# Patient Record
Sex: MALE | Race: WHITE | URBAN - METROPOLITAN AREA
[De-identification: names, ages, dates, MRNs, and addresses within clinical notes are randomized per-mention and may not be internally consistent; named-entity substitution may affect disease eponyms.]

---

## 2017-10-06 ENCOUNTER — EMERGENCY (EMERGENCY)
Facility: HOSPITAL | Age: 37
LOS: 0 days | Discharge: HOME | End: 2017-10-07
Admitting: INTERNAL MEDICINE

## 2017-10-06 DIAGNOSIS — Z87.891 PERSONAL HISTORY OF NICOTINE DEPENDENCE: ICD-10-CM

## 2017-10-06 DIAGNOSIS — R11.2 NAUSEA WITH VOMITING, UNSPECIFIED: ICD-10-CM

## 2017-10-06 DIAGNOSIS — R07.89 OTHER CHEST PAIN: ICD-10-CM

## 2017-10-06 DIAGNOSIS — R10.84 GENERALIZED ABDOMINAL PAIN: ICD-10-CM

## 2017-10-06 DIAGNOSIS — K52.9 NONINFECTIVE GASTROENTERITIS AND COLITIS, UNSPECIFIED: ICD-10-CM

## 2017-10-06 DIAGNOSIS — L03.90 CELLULITIS, UNSPECIFIED: ICD-10-CM

## 2018-04-11 ENCOUNTER — INPATIENT (INPATIENT)
Facility: HOSPITAL | Age: 38
LOS: 4 days | Discharge: LEFT BEFORE DISCHARGE | End: 2018-04-16
Attending: INTERNAL MEDICINE | Admitting: INTERNAL MEDICINE

## 2018-04-11 VITALS
DIASTOLIC BLOOD PRESSURE: 78 MMHG | RESPIRATION RATE: 20 BRPM | SYSTOLIC BLOOD PRESSURE: 171 MMHG | HEART RATE: 86 BPM | OXYGEN SATURATION: 99 % | TEMPERATURE: 102 F

## 2018-04-11 LAB
ANION GAP SERPL CALC-SCNC: 12 MMOL/L — SIGNIFICANT CHANGE UP (ref 7–14)
APTT BLD: 32.1 SEC — SIGNIFICANT CHANGE UP (ref 27–39.2)
BASOPHILS # BLD AUTO: 0.08 K/UL — SIGNIFICANT CHANGE UP (ref 0–0.2)
BASOPHILS NFR BLD AUTO: 0.3 % — SIGNIFICANT CHANGE UP (ref 0–1)
BLD GP AB SCN SERPL QL: SIGNIFICANT CHANGE UP
BUN SERPL-MCNC: 13 MG/DL — SIGNIFICANT CHANGE UP (ref 10–20)
CALCIUM SERPL-MCNC: 8.6 MG/DL — SIGNIFICANT CHANGE UP (ref 8.5–10.1)
CHLORIDE SERPL-SCNC: 96 MMOL/L — LOW (ref 98–110)
CO2 SERPL-SCNC: 30 MMOL/L — SIGNIFICANT CHANGE UP (ref 17–32)
CREAT SERPL-MCNC: 1 MG/DL — SIGNIFICANT CHANGE UP (ref 0.7–1.5)
EOSINOPHIL # BLD AUTO: 0.14 K/UL — SIGNIFICANT CHANGE UP (ref 0–0.7)
EOSINOPHIL NFR BLD AUTO: 0.6 % — SIGNIFICANT CHANGE UP (ref 0–8)
GLUCOSE SERPL-MCNC: 143 MG/DL — HIGH (ref 70–99)
HCT VFR BLD CALC: 37 % — LOW (ref 42–52)
HGB BLD-MCNC: 12.9 G/DL — LOW (ref 14–18)
IMM GRANULOCYTES NFR BLD AUTO: 1 % — HIGH (ref 0.1–0.3)
INR BLD: 1.2 RATIO — SIGNIFICANT CHANGE UP (ref 0.65–1.3)
LACTATE SERPL-SCNC: 2.2 MMOL/L — SIGNIFICANT CHANGE UP (ref 0.5–2.2)
LYMPHOCYTES # BLD AUTO: 13.6 % — LOW (ref 20.5–51.1)
LYMPHOCYTES # BLD AUTO: 3.27 K/UL — SIGNIFICANT CHANGE UP (ref 1.2–3.4)
MCHC RBC-ENTMCNC: 29.5 PG — SIGNIFICANT CHANGE UP (ref 27–31)
MCHC RBC-ENTMCNC: 34.9 G/DL — SIGNIFICANT CHANGE UP (ref 32–37)
MCV RBC AUTO: 84.5 FL — SIGNIFICANT CHANGE UP (ref 80–94)
MONOCYTES # BLD AUTO: 1.51 K/UL — HIGH (ref 0.1–0.6)
MONOCYTES NFR BLD AUTO: 6.3 % — SIGNIFICANT CHANGE UP (ref 1.7–9.3)
NEUTROPHILS # BLD AUTO: 18.76 K/UL — HIGH (ref 1.4–6.5)
NEUTROPHILS NFR BLD AUTO: 78.2 % — HIGH (ref 42.2–75.2)
NRBC # BLD: 0 /100 WBCS — SIGNIFICANT CHANGE UP (ref 0–0)
PLATELET # BLD AUTO: 304 K/UL — SIGNIFICANT CHANGE UP (ref 130–400)
POTASSIUM SERPL-MCNC: 3.8 MMOL/L — SIGNIFICANT CHANGE UP (ref 3.5–5)
POTASSIUM SERPL-SCNC: 3.8 MMOL/L — SIGNIFICANT CHANGE UP (ref 3.5–5)
PROTHROM AB SERPL-ACNC: 13 SEC — HIGH (ref 9.95–12.87)
RBC # BLD: 4.38 M/UL — LOW (ref 4.7–6.1)
RBC # FLD: 11.9 % — SIGNIFICANT CHANGE UP (ref 11.5–14.5)
SODIUM SERPL-SCNC: 138 MMOL/L — SIGNIFICANT CHANGE UP (ref 135–146)
TYPE + AB SCN PNL BLD: SIGNIFICANT CHANGE UP
WBC # BLD: 23.99 K/UL — HIGH (ref 4.8–10.8)
WBC # FLD AUTO: 23.99 K/UL — HIGH (ref 4.8–10.8)

## 2018-04-11 RX ORDER — ACETAMINOPHEN 500 MG
650 TABLET ORAL EVERY 4 HOURS
Qty: 0 | Refills: 0 | Status: DISCONTINUED | OUTPATIENT
Start: 2018-04-11 | End: 2018-04-12

## 2018-04-11 RX ORDER — IBUPROFEN 200 MG
600 TABLET ORAL ONCE
Qty: 0 | Refills: 0 | Status: COMPLETED | OUTPATIENT
Start: 2018-04-11 | End: 2018-04-11

## 2018-04-11 RX ORDER — SODIUM CHLORIDE 9 MG/ML
3 INJECTION INTRAMUSCULAR; INTRAVENOUS; SUBCUTANEOUS EVERY 8 HOURS
Qty: 0 | Refills: 0 | Status: DISCONTINUED | OUTPATIENT
Start: 2018-04-11 | End: 2018-04-16

## 2018-04-11 RX ORDER — ENOXAPARIN SODIUM 100 MG/ML
40 INJECTION SUBCUTANEOUS DAILY
Qty: 0 | Refills: 0 | Status: DISCONTINUED | OUTPATIENT
Start: 2018-04-11 | End: 2018-04-16

## 2018-04-11 RX ORDER — SODIUM CHLORIDE 9 MG/ML
1000 INJECTION INTRAMUSCULAR; INTRAVENOUS; SUBCUTANEOUS
Qty: 0 | Refills: 0 | Status: DISCONTINUED | OUTPATIENT
Start: 2018-04-11 | End: 2018-04-12

## 2018-04-11 RX ORDER — PIPERACILLIN AND TAZOBACTAM 4; .5 G/20ML; G/20ML
3.38 INJECTION, POWDER, LYOPHILIZED, FOR SOLUTION INTRAVENOUS EVERY 8 HOURS
Qty: 0 | Refills: 0 | Status: DISCONTINUED | OUTPATIENT
Start: 2018-04-11 | End: 2018-04-12

## 2018-04-11 RX ORDER — VANCOMYCIN HCL 1 G
1000 VIAL (EA) INTRAVENOUS ONCE
Qty: 0 | Refills: 0 | Status: COMPLETED | OUTPATIENT
Start: 2018-04-11 | End: 2018-04-11

## 2018-04-11 RX ORDER — SODIUM CHLORIDE 9 MG/ML
2000 INJECTION INTRAMUSCULAR; INTRAVENOUS; SUBCUTANEOUS
Qty: 0 | Refills: 0 | Status: DISCONTINUED | OUTPATIENT
Start: 2018-04-11 | End: 2018-04-12

## 2018-04-11 RX ORDER — VANCOMYCIN HCL 1 G
1000 VIAL (EA) INTRAVENOUS EVERY 12 HOURS
Qty: 0 | Refills: 0 | Status: DISCONTINUED | OUTPATIENT
Start: 2018-04-11 | End: 2018-04-12

## 2018-04-11 RX ORDER — PIPERACILLIN AND TAZOBACTAM 4; .5 G/20ML; G/20ML
3.38 INJECTION, POWDER, LYOPHILIZED, FOR SOLUTION INTRAVENOUS ONCE
Qty: 0 | Refills: 0 | Status: COMPLETED | OUTPATIENT
Start: 2018-04-11 | End: 2018-04-12

## 2018-04-11 RX ADMIN — SODIUM CHLORIDE 1000 MILLILITER(S): 9 INJECTION INTRAMUSCULAR; INTRAVENOUS; SUBCUTANEOUS at 22:57

## 2018-04-11 RX ADMIN — SODIUM CHLORIDE 3 MILLILITER(S): 9 INJECTION INTRAMUSCULAR; INTRAVENOUS; SUBCUTANEOUS at 21:30

## 2018-04-11 RX ADMIN — Medication 600 MILLIGRAM(S): at 19:52

## 2018-04-11 RX ADMIN — Medication 600 MILLIGRAM(S): at 20:20

## 2018-04-11 RX ADMIN — SODIUM CHLORIDE 250 MILLILITER(S): 9 INJECTION INTRAMUSCULAR; INTRAVENOUS; SUBCUTANEOUS at 21:30

## 2018-04-11 RX ADMIN — Medication 250 MILLIGRAM(S): at 21:30

## 2018-04-11 NOTE — H&P ADULT - NSHPPHYSICALEXAM_GEN_ALL_CORE
Vital Signs Last 24 Hrs  T(C): 39 (11 Apr 2018 20:50), Max: 39.1 (11 Apr 2018 18:20)  T(F): 102.2 (11 Apr 2018 20:50), Max: 102.4 (11 Apr 2018 18:20)  HR: 80 (11 Apr 2018 20:50) (80 - 86)  BP: 148/69 (11 Apr 2018 20:50) (148/69 - 171/78)  BP(mean): --  RR: 18 (11 Apr 2018 20:50) (18 - 20)  SpO2: 97% (11 Apr 2018 20:50) (97% - 99%)    PHYSICAL EXAM:  GENERAL: NAD, well-developed  HEAD:  Atraumatic, Normocephalic  EYES: EOMI, PERRLA, conjunctiva and sclera clear  NECK: Supple, No JVD  CHEST/LUNG: Clear to auscultation bilaterally; No wheeze  HEART: Regular rate and rhythm; No murmurs, rubs, or gallops  ABDOMEN: Soft, Nontender, Nondistended; Bowel sounds present  EXTREMITIES:  2+ Peripheral Pulses, No clubbing, cyanosis, or edema  PSYCH: AAOx3  NEUROLOGY: non-focal  SKIN: right forearm abscess with ulcer, draining yellow/white pus. surrounding erythema., no crepitus.

## 2018-04-11 NOTE — ED PROVIDER NOTE - PHYSICAL EXAMINATION
VITAL SIGNS: I have reviewed nursing notes and confirm.  CONSTITUTIONAL: Well-developed; well-nourished; in mild distress.  SKIN: Skin exam is warm and dry, right forearm abscess with ulcer, draining yellow/white pus. surrounding erythema., no crepitus.   HEAD: Normocephalic; atraumatic.  EYES: PERRL, EOM intact; conjunctiva and sclera clear.  ENT: No nasal discharge; airway clear.   NECK: Supple; non tender.  CARD:+ S1, S2   RESP: No wheezes, rales or rhonchi.  ABD: Normal bowel sounds; soft; non-distended; non-tender;  EXT: Normal ROM. No cyanosis or edema.  LYMPH: No acute adenopathy.  NEURO: Alert. Grossly unremarkable. No focal deficits.  PSYCH: Cooperative, appropriate.

## 2018-04-11 NOTE — ED PROVIDER NOTE - CARE PLAN
Principal Discharge DX:	Abscess of forearm  Secondary Diagnosis:	Cellulitis of forearm, right  Secondary Diagnosis:	Heroin abuse

## 2018-04-11 NOTE — H&P ADULT - ASSESSMENT
38 yo male with pmh of IVDA; presented with right forearm pain, erythema and discharging lesion.    1) cellulitis with abscess formation that is draining  -admit to medicine  -blood cx  -iv abx ( vanco and zosyn)  -ID c/s  -Fu CT on forearm  -fever and pain control by tylenol  -DVT ppx 36 yo male with pmh of IVDA; presented with right forearm pain, erythema and discharging abcess.    1) cellulitis with abscess formation that is draining  -admit to medicine  -blood cx  -iv abx ( vanco and zosyn)  -ID c/s  -Consider general SX eval if no improvement  -local wound care  -Fu CT on forearm  -fever and pain control by tylenol  -DVT ppx

## 2018-04-11 NOTE — ED PROVIDER NOTE - OBJECTIVE STATEMENT
pt c/o pain to right forearm, is IVDA, heroin, needles. pain for 1 week. went to [facility] but sts they didn't do anything. +fever and chills.

## 2018-04-11 NOTE — ED PROVIDER NOTE - ENMT NEGATIVE STATEMENT, MLM
Walk in
Ears: no ear pain and no hearing problems.Nose: no nasal congestion and no nasal drainage.Mouth/Throat: no dysphagia, no hoarseness and no throat pain.Neck: no lumps, no pain, no stiffness and no swollen glands.

## 2018-04-11 NOTE — H&P ADULT - NSHPLABSRESULTS_GEN_ALL_CORE
12.9   23.99 )-----------( 304      ( 11 Apr 2018 21:32 )             37.0     04-11    138  |  96<L>  |  13  ----------------------------<  143<H>  3.8   |  30  |  1.0    Ca    8.6      11 Apr 2018 21:32      Lactate, Blood: 2.2 mmol/L (04.11.18 @ 21:32)    Right forearm XR: Pending official report but no gas formation is seen

## 2018-04-11 NOTE — H&P ADULT - HISTORY OF PRESENT ILLNESS
36 yo male with pmh of IVDA ( heroin) presented with severe right forearm pain and erythema associated with draining lesion for 1 week. Patient sought medical care at [facility] but northing was done according to him. Patient also reports fever and chills. 38 yo male with pmh of IVDA ( heroin) presented with severe pain and erythema in the upper ventral part of the right forearm associated with draining lesion  (abcess) for 1 week. Patient sought medical care at [facility] but northing was done according to him. Patient also reports fever and chills.

## 2018-04-12 RX ORDER — CEFEPIME 1 G/1
2000 INJECTION, POWDER, FOR SOLUTION INTRAMUSCULAR; INTRAVENOUS EVERY 8 HOURS
Qty: 0 | Refills: 0 | Status: DISCONTINUED | OUTPATIENT
Start: 2018-04-12 | End: 2018-04-16

## 2018-04-12 RX ORDER — METHADONE HYDROCHLORIDE 40 MG/1
10 TABLET ORAL ONCE
Qty: 0 | Refills: 0 | Status: DISCONTINUED | OUTPATIENT
Start: 2018-04-12 | End: 2018-04-12

## 2018-04-12 RX ORDER — CEFEPIME 1 G/1
INJECTION, POWDER, FOR SOLUTION INTRAMUSCULAR; INTRAVENOUS
Qty: 0 | Refills: 0 | Status: DISCONTINUED | OUTPATIENT
Start: 2018-04-12 | End: 2018-04-16

## 2018-04-12 RX ORDER — OXYCODONE AND ACETAMINOPHEN 5; 325 MG/1; MG/1
1 TABLET ORAL EVERY 4 HOURS
Qty: 0 | Refills: 0 | Status: DISCONTINUED | OUTPATIENT
Start: 2018-04-12 | End: 2018-04-12

## 2018-04-12 RX ORDER — VANCOMYCIN HCL 1 G
VIAL (EA) INTRAVENOUS
Qty: 0 | Refills: 0 | Status: DISCONTINUED | OUTPATIENT
Start: 2018-04-12 | End: 2018-04-16

## 2018-04-12 RX ORDER — METHADONE HYDROCHLORIDE 40 MG/1
TABLET ORAL
Qty: 0 | Refills: 0 | Status: COMPLETED | OUTPATIENT
Start: 2018-04-13 | End: 2018-04-15

## 2018-04-12 RX ORDER — VANCOMYCIN HCL 1 G
1500 VIAL (EA) INTRAVENOUS EVERY 12 HOURS
Qty: 0 | Refills: 0 | Status: DISCONTINUED | OUTPATIENT
Start: 2018-04-13 | End: 2018-04-16

## 2018-04-12 RX ORDER — HYDROXYZINE HCL 10 MG
50 TABLET ORAL EVERY 6 HOURS
Qty: 0 | Refills: 0 | Status: DISCONTINUED | OUTPATIENT
Start: 2018-04-12 | End: 2018-04-16

## 2018-04-12 RX ORDER — VANCOMYCIN HCL 1 G
1500 VIAL (EA) INTRAVENOUS ONCE
Qty: 0 | Refills: 0 | Status: COMPLETED | OUTPATIENT
Start: 2018-04-12 | End: 2018-04-12

## 2018-04-12 RX ORDER — METHADONE HYDROCHLORIDE 40 MG/1
10 TABLET ORAL EVERY 12 HOURS
Qty: 0 | Refills: 0 | Status: DISCONTINUED | OUTPATIENT
Start: 2018-04-13 | End: 2018-04-13

## 2018-04-12 RX ORDER — CEFEPIME 1 G/1
2000 INJECTION, POWDER, FOR SOLUTION INTRAMUSCULAR; INTRAVENOUS ONCE
Qty: 0 | Refills: 0 | Status: COMPLETED | OUTPATIENT
Start: 2018-04-12 | End: 2018-04-12

## 2018-04-12 RX ORDER — SODIUM CHLORIDE 9 MG/ML
1000 INJECTION INTRAMUSCULAR; INTRAVENOUS; SUBCUTANEOUS
Qty: 0 | Refills: 0 | Status: DISCONTINUED | OUTPATIENT
Start: 2018-04-12 | End: 2018-04-13

## 2018-04-12 RX ORDER — IBUPROFEN 200 MG
600 TABLET ORAL EVERY 6 HOURS
Qty: 0 | Refills: 0 | Status: DISCONTINUED | OUTPATIENT
Start: 2018-04-12 | End: 2018-04-16

## 2018-04-12 RX ORDER — METHADONE HYDROCHLORIDE 40 MG/1
5 TABLET ORAL EVERY 12 HOURS
Qty: 0 | Refills: 0 | Status: DISCONTINUED | OUTPATIENT
Start: 2018-04-13 | End: 2018-04-15

## 2018-04-12 RX ADMIN — Medication 50 MILLIGRAM(S): at 18:06

## 2018-04-12 RX ADMIN — Medication 300 MILLIGRAM(S): at 15:13

## 2018-04-12 RX ADMIN — Medication 250 MILLIGRAM(S): at 05:36

## 2018-04-12 RX ADMIN — SODIUM CHLORIDE 3 MILLILITER(S): 9 INJECTION INTRAMUSCULAR; INTRAVENOUS; SUBCUTANEOUS at 05:28

## 2018-04-12 RX ADMIN — CEFEPIME 100 MILLIGRAM(S): 1 INJECTION, POWDER, FOR SOLUTION INTRAMUSCULAR; INTRAVENOUS at 21:03

## 2018-04-12 RX ADMIN — SODIUM CHLORIDE 3 MILLILITER(S): 9 INJECTION INTRAMUSCULAR; INTRAVENOUS; SUBCUTANEOUS at 13:46

## 2018-04-12 RX ADMIN — SODIUM CHLORIDE 75 MILLILITER(S): 9 INJECTION INTRAMUSCULAR; INTRAVENOUS; SUBCUTANEOUS at 06:00

## 2018-04-12 RX ADMIN — PIPERACILLIN AND TAZOBACTAM 200 GRAM(S): 4; .5 INJECTION, POWDER, LYOPHILIZED, FOR SOLUTION INTRAVENOUS at 01:36

## 2018-04-12 RX ADMIN — SODIUM CHLORIDE 75 MILLILITER(S): 9 INJECTION INTRAMUSCULAR; INTRAVENOUS; SUBCUTANEOUS at 01:37

## 2018-04-12 RX ADMIN — METHADONE HYDROCHLORIDE 10 MILLIGRAM(S): 40 TABLET ORAL at 21:03

## 2018-04-12 RX ADMIN — OXYCODONE AND ACETAMINOPHEN 1 TABLET(S): 5; 325 TABLET ORAL at 11:28

## 2018-04-12 RX ADMIN — METHADONE HYDROCHLORIDE 10 MILLIGRAM(S): 40 TABLET ORAL at 15:46

## 2018-04-12 RX ADMIN — ENOXAPARIN SODIUM 40 MILLIGRAM(S): 100 INJECTION SUBCUTANEOUS at 11:29

## 2018-04-12 RX ADMIN — CEFEPIME 100 MILLIGRAM(S): 1 INJECTION, POWDER, FOR SOLUTION INTRAMUSCULAR; INTRAVENOUS at 13:25

## 2018-04-12 RX ADMIN — SODIUM CHLORIDE 3 MILLILITER(S): 9 INJECTION INTRAMUSCULAR; INTRAVENOUS; SUBCUTANEOUS at 21:08

## 2018-04-12 NOTE — CONSULT NOTE ADULT - SUBJECTIVE AND OBJECTIVE BOX
37 Y male with opiate use disorder came in for an infection on his right forearm. He reports using IV heroin 3 bundles daily for 2 years. He is currently being treated with 37 Y male with opiate use disorder came in for an infection on his right forearm. He reports using IV heroin 3 bundles daily for 2 years. He denies other drug use, alcohol use, smokes 1ppd. He is currently being treated with vanco and cefepime for an abscess. He reports several episodes of diarrhea and vomiting this morning. He also reports subjective chills, anxiety, and body aches. No tremor, piloerection of skin, no yawning, no visible runny nose. Pupils are regular size. COWS score is 7.     Vitals  Vital Signs Last 24 Hrs  T(C): 36.9 (12 Apr 2018 14:40), Max: 39.1 (11 Apr 2018 18:20)  T(F): 98.4 (12 Apr 2018 14:40), Max: 102.4 (11 Apr 2018 18:20)  HR: 71 (12 Apr 2018 14:40) (71 - 86)  BP: 132/61 (12 Apr 2018 14:40) (132/61 - 171/78)  BP(mean): --  RR: 17 (12 Apr 2018 14:40) (16 - 20)  SpO2: 97% (11 Apr 2018 20:50) (97% - 99%)                          12.9   23.99 )-----------( 304      ( 11 Apr 2018 21:32 )             37.0     Physical exam:  General: no acute distress  HEENT: pupils are approximately 3-4mm, no nasal discharge  Cardio; regular rate, no tachycardia  Skin: no piloerection  musculoskeletal: no tremor  psychiatry: mildly anxious, no psychomotor agitation    Assessment: 37 Y male with 2 year history of IV heroin use, currently uses 2 bundles a day, admitted to medicine for a forearm abscess. Patient currently has a COWS score of 7- in mild withdrawal.    Plan:  1. obtain stat ekg, if qtc <500, can initiate moderate methadone taper  2. prn ibuprofen, loperamide, vistaril (if qtc<500) for symptomatic management  3. recommend rehab when medically stable and detoxed- patient expresses interest.

## 2018-04-12 NOTE — CONSULT NOTE ADULT - SUBJECTIVE AND OBJECTIVE BOX
LUCY TIMOTHY  37y, Male  Allergy: No Known Allergies      HPI:  36 yo male with pmh of IVDA ( heroin) presented with severe pain and erythema in the upper ventral part of the right forearm associated with draining lesion  (abcess) for 1 week. Patient sought medical care at [facility] but northing was done according to him. Patient also reports fever and chills. (11 Apr 2018 22:55)    FAMILY HISTORY:  No pertinent family history in first degree relatives    PAST MEDICAL & SURGICAL HISTORY:  Intravenous drug abuse  No significant past surgical history        VITALS:  T(F): 99.9, Max: 102.4 (04-11-18 @ 18:20)  HR: 72  BP: 155/69  RR: 16Vital Signs Last 24 Hrs  T(C): 37.7 (12 Apr 2018 01:45), Max: 39.1 (11 Apr 2018 18:20)  T(F): 99.9 (12 Apr 2018 01:45), Max: 102.4 (11 Apr 2018 18:20)  HR: 72 (12 Apr 2018 07:05) (72 - 86)  BP: 155/69 (12 Apr 2018 07:05) (148/69 - 171/78)  BP(mean): --  RR: 16 (12 Apr 2018 07:05) (16 - 20)  SpO2: 97% (11 Apr 2018 20:50) (97% - 99%)    TESTS & MEASUREMENTS:                        12.9   23.99 )-----------( 304      ( 11 Apr 2018 21:32 )             37.0     04-11    138  |  96<L>  |  13  ----------------------------<  143<H>  3.8   |  30  |  1.0    Ca    8.6      11 Apr 2018 21:32                RADIOLOGY & ADDITIONAL TESTS:    ANTIBIOTICS:  piperacillin/tazobactam IVPB. 3.375 Gram(s) IV Intermittent every 8 hours  vancomycin  IVPB 1000 milliGRAM(s) IV Intermittent every 12 hours

## 2018-04-12 NOTE — CONSULT NOTE ADULT - ASSESSMENT
37M with right forearm abscess s/p IVDA  - to be seen by Burn attending
A/P     Cellulitis right forearm with abscess. Extent ? and worsening cellulitis  CT shows no evidence of collection   Continue IV abx and wtch for another 24 hours for improvement or worsening   Discussed exploration I& D/ irrigation with pt. He consents
IMPRESSION:  Superficial thrombophlebitis with surrounding abscess/cellulitis with possible fascitis with suppuration.  Doubt endocarditis or sepsis.  RECOMMENDATIONS:  Surgical evaluation.  HIV test.  [id number] q12h  Cefepime 2gm q8h  F/U blood cultures.

## 2018-04-12 NOTE — PROGRESS NOTE ADULT - ASSESSMENT
38 yo male with pmhx of IVDA; presented with right forearm pain, erythema and discharging abscess    #) Cellulitis with draining abscess  - daily dressing; keep forearm elevated  - continue Vanco and Zosyn    - f/u blood cx  - Right forearm XR: Pending official report but no gas formation is seen  - f/u CT on forearm  - ID consult: pending  - consider general Sx eval if no improvement  - fever and pain control by Tylenol    #) DVT ppx  - continue Lovenox    #) Disposition  - from home    #) Full code status

## 2018-04-12 NOTE — CONSULT NOTE ADULT - SUBJECTIVE AND OBJECTIVE BOX
HPI: Patient is a 37M with no PMH/PSH who is an IV drug user who presented to the ED yesterday with severe pain, swelling, and drainage from right forearm. Patient states that he noted induration in the area approximately 1 week ago and that the pain and swelling progressed. He states that the abscess ruptured and began draining spontaneously. He denies prior history of abscess. He endorses fever and chills which have now resolved. He denies CP/SOB/N/V/D/AMS/HA.    Upon arrival to the hospital he was noted to have T 102.2 with no tachycardia or tachypnea. WBC 23.99. Radiographs revealed no fracture, no foreign body, no gas. Cultures were drawn and he was given empiric antibiotics. Patient s/e on nursing carias. He is AAOx4. Current vitals wnl, fever resolved. He is resting comfortably. Burn is consulted for abscess.    O:  VS  T 99.9  HR 72  /69  RR 16    Lab:  WBC 23.99    XR:  R forearm  no fxr, swelling, no foreign body, no gas    PhyX:  Gen: AAOx4, NAD  HEENT: AT/NC  Ext: R proximal forearm with 5x5cm area of induration, erythema  TTP @ R forearm  central opening noted with pus noted  no subcutaneous emphysema  FAROM R elbow  FAROM R wrist  Hand WWP  SILT

## 2018-04-12 NOTE — PROGRESS NOTE ADULT - SUBJECTIVE AND OBJECTIVE BOX
SUBJECTIVE:    Patient is a 37y old Male who presents with a chief complaint of right forearm pain, erythema, and discharging lesion. (11 Apr 2018 22:55)    Currently admitted to medicine with the primary diagnosis of Abscess of forearm     Today is hospital day 1d. This morning he is resting comfortably in bed and reports no new issues or overnight events.     PAST MEDICAL & SURGICAL HISTORY  Intravenous drug abuse  No significant past surgical history    SOCIAL HISTORY:  Negative for smoking/alcohol/drug use.     ALLERGIES:  No Known Allergies    MEDICATIONS:  STANDING MEDICATIONS  enoxaparin Injectable 40 milliGRAM(s) SubCutaneous daily  piperacillin/tazobactam IVPB. 3.375 Gram(s) IV Intermittent every 8 hours  sodium chloride 0.9% lock flush 3 milliLiter(s) IV Push every 8 hours  sodium chloride 0.9%. 1000 milliLiter(s) IV Continuous <Continuous>  sodium chloride 0.9%. 2000 milliLiter(s) IV Continuous <Continuous>  sodium chloride 0.9%. 1000 milliLiter(s) IV Continuous <Continuous>  vancomycin  IVPB 1000 milliGRAM(s) IV Intermittent every 12 hours    PRN MEDICATIONS  acetaminophen   Tablet 650 milliGRAM(s) Oral every 4 hours PRN    VITALS:   T(F): 99.9  HR: 81  BP: 170/81  RR: 17  SpO2: 97%    LABS:                        12.9   23.99 )-----------( 304      ( 11 Apr 2018 21:32 )             37.0     04-11    138  |  96<L>  |  13  ----------------------------<  143<H>  3.8   |  30  |  1.0    Ca    8.6      11 Apr 2018 21:32      PT/INR - ( 11 Apr 2018 21:32 )   PT: 13.00 sec;   INR: 1.20 ratio         PTT - ( 11 Apr 2018 21:32 )  PTT:32.1 sec      Lactate, Blood: 2.2 mmol/L (04-11-18 @ 21:32)        PHYSICAL EXAM:  GEN: No acute distress  LUNGS: Clear to auscultation bilaterally   HEART: S1/S2 present. RRR.   ABD: Soft, non-tender, non-distended. Bowel sounds present  EXT: NC/NC/NE/2+PP/JAY  NEURO: AAOX3  SKIN: right forearm abscess with ulcer, draining yellow/white pus, surrounding erythema, no crepitus

## 2018-04-12 NOTE — CONSULT NOTE ADULT - SUBJECTIVE AND OBJECTIVE BOX
HPI:  38 yo male with pmh of IVDA ( heroin) presented with severe pain and erythema in the upper ventral part of the right forearm associated with draining lesion  (abcess) for 1 week. Patient sought medical care at [facility] but northing was done according to him. Patient also reports fever and chills. (11 Apr 2018 22:55)                            12.9   23.99 )-----------( 304      ( 11 Apr 2018 21:32 )             37.0       EXAM  Right UE- cellulitis, induration and tenderness right arm extending across elbow to forearm   open 1cm wound volar  forearm with purulent drainage expressed .  no gross fluctuance

## 2018-04-13 RX ORDER — KETOROLAC TROMETHAMINE 30 MG/ML
15 SYRINGE (ML) INJECTION
Qty: 0 | Refills: 0 | Status: DISCONTINUED | OUTPATIENT
Start: 2018-04-13 | End: 2018-04-16

## 2018-04-13 RX ADMIN — Medication 50 MILLIGRAM(S): at 13:26

## 2018-04-13 RX ADMIN — OXYCODONE AND ACETAMINOPHEN 1 TABLET(S): 5; 325 TABLET ORAL at 12:09

## 2018-04-13 RX ADMIN — Medication 50 MILLIGRAM(S): at 05:26

## 2018-04-13 RX ADMIN — CEFEPIME 100 MILLIGRAM(S): 1 INJECTION, POWDER, FOR SOLUTION INTRAMUSCULAR; INTRAVENOUS at 05:26

## 2018-04-13 RX ADMIN — Medication 300 MILLIGRAM(S): at 17:21

## 2018-04-13 RX ADMIN — METHADONE HYDROCHLORIDE 5 MILLIGRAM(S): 40 TABLET ORAL at 21:41

## 2018-04-13 RX ADMIN — Medication 300 MILLIGRAM(S): at 06:00

## 2018-04-13 RX ADMIN — SODIUM CHLORIDE 125 MILLILITER(S): 9 INJECTION INTRAMUSCULAR; INTRAVENOUS; SUBCUTANEOUS at 01:25

## 2018-04-13 RX ADMIN — METHADONE HYDROCHLORIDE 10 MILLIGRAM(S): 40 TABLET ORAL at 09:05

## 2018-04-13 RX ADMIN — SODIUM CHLORIDE 3 MILLILITER(S): 9 INJECTION INTRAMUSCULAR; INTRAVENOUS; SUBCUTANEOUS at 21:41

## 2018-04-13 RX ADMIN — SODIUM CHLORIDE 3 MILLILITER(S): 9 INJECTION INTRAMUSCULAR; INTRAVENOUS; SUBCUTANEOUS at 13:26

## 2018-04-13 RX ADMIN — Medication 15 MILLIGRAM(S): at 17:21

## 2018-04-13 RX ADMIN — CEFEPIME 100 MILLIGRAM(S): 1 INJECTION, POWDER, FOR SOLUTION INTRAMUSCULAR; INTRAVENOUS at 13:26

## 2018-04-13 RX ADMIN — CEFEPIME 100 MILLIGRAM(S): 1 INJECTION, POWDER, FOR SOLUTION INTRAMUSCULAR; INTRAVENOUS at 21:41

## 2018-04-13 RX ADMIN — SODIUM CHLORIDE 3 MILLILITER(S): 9 INJECTION INTRAMUSCULAR; INTRAVENOUS; SUBCUTANEOUS at 05:31

## 2018-04-13 RX ADMIN — Medication 15 MILLIGRAM(S): at 18:00

## 2018-04-13 NOTE — PROGRESS NOTE ADULT - SUBJECTIVE AND OBJECTIVE BOX
Pt stable overnight  states that RUE feels better    EXAM:    significantly  decreased cellulitis, induration and tenderness of arm and forearm  purulent drainage expressed  from open crusted open wound

## 2018-04-13 NOTE — PROCEDURE NOTE - PROCEDURE
<<-----Click on this checkbox to enter Procedure Incision and drainage  04/13/2018  Right forearm  Active  HEIDI

## 2018-04-13 NOTE — PROGRESS NOTE ADULT - ASSESSMENT
38 yo male with pmhx of IVDA; presented with right forearm pain, erythema and discharging abscess    #) Cellulitis with draining abscess  - daily dressing; keep forearm elevated  - continue Vanco and Zosyn    - f/u blood cx  - Right forearm XR: Pending official report but no gas formation is seen  - f/u CT on forearm  - ID consult: pending  - consider general Sx eval if no improvement  - fever and pain control by Tylenol    #) DVT ppx  - continue Lovenox    #) Disposition  - from home    #) Full code status 38 yo male with pmhx of IVDA; presented with right forearm pain, erythema and discharging abscess    #) Cellulitis with draining abscess  - daily dressing; keep forearm elevated  - ID consult appreciated: continue [id number] q12h and Cefepime 2gm q8h  - blood cx: no growth   - f/u CT on forearm: Diffuse subcutaneous edema without focal drainable abscess  - Burn following: CT shows no abscess will f/u in 24hrs  - pain control: prn ibuprofen, loperamide, vistaril (if qtc<500) for symptomatic management    #) Opoid withdrawal  - Detox following   - continue methadone taper    #) DVT ppx  - continue Lovenox    #) Disposition  -  consult  - detox program: patient expresses interest.    #) Full code status 38 yo male with pmhx of IVDA; presented with right forearm pain, erythema and discharging abscess    #) Cellulitis of right forearm  - daily dressing; keep forearm elevated  - ID consult appreciated: continue [id number] q12h and Cefepime 2gm q8h; on disachrge change to Augmentin 875mg q12 and Levaquin 750mg q24 for 14 days  - blood cx: no growth   - f/u CT on forearm: Diffuse subcutaneous edema without focal drainable abscess  - Burn following: CT shows no abscess will f/u in 24hrs  - pain control: start IV toradol    #) Opoid withdrawal  - Detox following   - continue methadone taper    #) DVT ppx  - continue Lovenox    #) Disposition  -  consult  - detox program: patient expresses interest    #) Full code status 38 yo male with pmhx of IVDA; presented with right forearm pain, erythema and discharging abscess    #) Cellulitis of right forearm  - daily dressing; keep forearm elevated  - ID consult appreciated: continue [id number] q12h and Cefepime 2gm q8h; on discharge change to Augmentin 875mg q12 and Levaquin 750mg q24 for 14 days  - blood cx: no growth   - f/u CT on forearm: Diffuse subcutaneous edema without focal drainable abscess  - Burn following: CT shows no abscess will f/u in 24hrs  - pain control: start IV toradol    #) Opoid withdrawal  - Detox following   - continue methadone taper    #) DVT ppx  - continue Lovenox    #) Disposition  -  consult  - detox program: patient expresses interest    #) Full code status

## 2018-04-13 NOTE — PROGRESS NOTE ADULT - SUBJECTIVE AND OBJECTIVE BOX
TIMOTHY AYALA  37y, Male      OVERNIGHT EVENTS:    none    VITALS:  T(F): 98.8, Max: 99.3 (04-12-18 @ 21:40)  HR: 65  BP: 129/71  RR: 18Vital Signs Last 24 Hrs  T(C): 37.1 (13 Apr 2018 05:23), Max: 37.4 (12 Apr 2018 21:40)  T(F): 98.8 (13 Apr 2018 05:23), Max: 99.3 (12 Apr 2018 21:40)  HR: 65 (13 Apr 2018 05:23) (65 - 74)  BP: 129/71 (13 Apr 2018 05:23) (129/71 - 146/65)  BP(mean): --  RR: 18 (13 Apr 2018 05:23) (17 - 18)  SpO2: --    TESTS & MEASUREMENTS:                        12.9   23.99 )-----------( 304      ( 11 Apr 2018 21:32 )             37.0     04-11    138  |  96<L>  |  13  ----------------------------<  143<H>  3.8   |  30  |  1.0    Ca    8.6      11 Apr 2018 21:32          Culture - Blood (collected 04-11-18 @ 21:32)  Source: .Blood Blood  Preliminary Report (04-13-18 @ 02:05):    No growth to date.    Culture - Blood (collected 04-11-18 @ 21:32)  Source: .Blood Blood  Preliminary Report (04-13-18 @ 02:05):    No growth to date.            RADIOLOGY & ADDITIONAL TESTS:    ANTIBIOTICS:  cefepime  IVPB      cefepime  IVPB 2000 milliGRAM(s) IV Intermittent every 8 hours  vancomycin  IVPB      vancomycin  IVPB 1500 milliGRAM(s) IV Intermittent every 12 hours

## 2018-04-13 NOTE — PROGRESS NOTE ADULT - ASSESSMENT
A/P:    Long discussion with patient- advised that improvement is significant- rec limited I&D of local area around open wound to allow for debridement and  less painful packing and wound care  Pt agrees to bedside debridement under local anesthesia

## 2018-04-13 NOTE — PROGRESS NOTE ADULT - SUBJECTIVE AND OBJECTIVE BOX
SUBJECTIVE:    Patient is a 37y old Male who presents with a chief complaint of right forearm pain, erythema, and discharging lesion. (11 Apr 2018 22:55)    Currently admitted to medicine with the primary diagnosis of Abscess of forearm     Today is hospital day 2d. This morning he is resting comfortably in bed and reports no new issues or overnight events.     PAST MEDICAL & SURGICAL HISTORY  Intravenous drug abuse  No significant past surgical history    SOCIAL HISTORY:  Negative for smoking/alcohol/drug use.     ALLERGIES:  No Known Allergies    MEDICATIONS:  STANDING MEDICATIONS  cefepime  IVPB      cefepime  IVPB 2000 milliGRAM(s) IV Intermittent every 8 hours  enoxaparin Injectable 40 milliGRAM(s) SubCutaneous daily  methadone    Tablet 10 milliGRAM(s) Oral every 12 hours  methadone    Tablet 5 milliGRAM(s) Oral every 12 hours  sodium chloride 0.9% lock flush 3 milliLiter(s) IV Push every 8 hours  sodium chloride 0.9%. 1000 milliLiter(s) IV Continuous <Continuous>  vancomycin  IVPB      vancomycin  IVPB 1500 milliGRAM(s) IV Intermittent every 12 hours    PRN MEDICATIONS  hydrOXYzine hydrochloride 50 milliGRAM(s) Oral every 6 hours PRN  ibuprofen  Tablet 600 milliGRAM(s) Oral every 6 hours PRN    VITALS:   T(F): 98.8  HR: 65  BP: 129/71  RR: 18  SpO2: --    LABS:                        12.9   23.99 )-----------( 304      ( 11 Apr 2018 21:32 )             37.0     04-11    138  |  96<L>  |  13  ----------------------------<  143<H>  3.8   |  30  |  1.0    Ca    8.6      11 Apr 2018 21:32      PT/INR - ( 11 Apr 2018 21:32 )   PT: 13.00 sec;   INR: 1.20 ratio         PTT - ( 11 Apr 2018 21:32 )  PTT:32.1 sec      Culture - Blood (collected 11 Apr 2018 21:32)  Source: .Blood Blood  Preliminary Report (13 Apr 2018 02:05):    No growth to date.    Culture - Blood (collected 11 Apr 2018 21:32)  Source: .Blood Blood  Preliminary Report (13 Apr 2018 02:05):    No growth to date.      RADIOLOGY:  CT Upper Extremity w/ IV Cont, Right (04.12.18)   No acute osseous abnormality.  Diffuse subcutaneous edema without focal drainable abscess.   No subcutaneous emphysema. 7 mm focal skin defect at the radial volar aspect of the proximal forearm.          PHYSICAL EXAM:  GEN: No acute distress  LUNGS: Clear to auscultation bilaterally   HEART: S1/S2 present. RRR.   ABD: Soft, non-tender, non-distended. Bowel sounds present  EXT: NC/NC/NE/2+PP/JAY  NEURO: AAOX3  SKIN: right forearm abscess with ulcer, draining yellow/white pus, surrounding erythema, no crepitus SUBJECTIVE:    Patient is a 37y old Male who presents with a chief complaint of right forearm pain, erythema, and discharging lesion. (11 Apr 2018 22:55)    Currently admitted to medicine with the primary diagnosis of Abscess of forearm     Today is hospital day 2d. This morning he is resting comfortably in bed and reports no new issues or overnight events.     PAST MEDICAL & SURGICAL HISTORY  Intravenous drug abuse  No significant past surgical history    SOCIAL HISTORY:  Negative for smoking/alcohol/drug use.     ALLERGIES:  No Known Allergies    MEDICATIONS:  STANDING MEDICATIONS  cefepime  IVPB      cefepime  IVPB 2000 milliGRAM(s) IV Intermittent every 8 hours  enoxaparin Injectable 40 milliGRAM(s) SubCutaneous daily  methadone    Tablet 10 milliGRAM(s) Oral every 12 hours  methadone    Tablet 5 milliGRAM(s) Oral every 12 hours  sodium chloride 0.9% lock flush 3 milliLiter(s) IV Push every 8 hours  sodium chloride 0.9%. 1000 milliLiter(s) IV Continuous <Continuous>  vancomycin  IVPB      vancomycin  IVPB 1500 milliGRAM(s) IV Intermittent every 12 hours    PRN MEDICATIONS  hydrOXYzine hydrochloride 50 milliGRAM(s) Oral every 6 hours PRN  ibuprofen  Tablet 600 milliGRAM(s) Oral every 6 hours PRN    VITALS:   T(F): 98.8  HR: 65  BP: 129/71  RR: 18  SpO2: --    LABS:                        12.9   23.99 )-----------( 304      ( 11 Apr 2018 21:32 )             37.0     04-11    138  |  96<L>  |  13  ----------------------------<  143<H>  3.8   |  30  |  1.0    Ca    8.6      11 Apr 2018 21:32      PT/INR - ( 11 Apr 2018 21:32 )   PT: 13.00 sec;   INR: 1.20 ratio         PTT - ( 11 Apr 2018 21:32 )  PTT:32.1 sec      Culture - Blood (collected 11 Apr 2018 21:32)  Source: .Blood Blood  Preliminary Report (13 Apr 2018 02:05):    No growth to date.    Culture - Blood (collected 11 Apr 2018 21:32)  Source: .Blood Blood  Preliminary Report (13 Apr 2018 02:05):    No growth to date.      RADIOLOGY:  CT Upper Extremity w/ IV Cont, Right (04.12.18)   No acute osseous abnormality.  Diffuse subcutaneous edema without focal drainable abscess.   No subcutaneous emphysema. 7 mm focal skin defect at the radial volar aspect of the proximal forearm.      PHYSICAL EXAM:  GEN: No acute distress  LUNGS: Clear to auscultation bilaterally   HEART: S1/S2 present. RRR.   ABD: Soft, non-tender, non-distended. Bowel sounds present  EXT: NC/NC/NE/2+PP/JAY  NEURO: AAOX3  SKIN: open 1cm wound in right forearm with purulent drainage expressed. No gross fluctuance SUBJECTIVE:    Patient is a 37y old Male who presents with a chief complaint of right forearm pain, erythema, and discharging lesion. (11 Apr 2018 22:55)    Currently admitted to medicine with the primary diagnosis of Abscess of forearm     Today is hospital day 2d. This morning he is resting comfortably in bed and reports no new issues or overnight events. Patient refused todays am rounds and physical exam.     PAST MEDICAL & SURGICAL HISTORY  Intravenous drug abuse  No significant past surgical history    SOCIAL HISTORY:  Negative for smoking/alcohol/drug use.     ALLERGIES:  No Known Allergies    MEDICATIONS:  STANDING MEDICATIONS  cefepime  IVPB      cefepime  IVPB 2000 milliGRAM(s) IV Intermittent every 8 hours  enoxaparin Injectable 40 milliGRAM(s) SubCutaneous daily  methadone    Tablet 10 milliGRAM(s) Oral every 12 hours  methadone    Tablet 5 milliGRAM(s) Oral every 12 hours  sodium chloride 0.9% lock flush 3 milliLiter(s) IV Push every 8 hours  sodium chloride 0.9%. 1000 milliLiter(s) IV Continuous <Continuous>  vancomycin  IVPB      vancomycin  IVPB 1500 milliGRAM(s) IV Intermittent every 12 hours    PRN MEDICATIONS  hydrOXYzine hydrochloride 50 milliGRAM(s) Oral every 6 hours PRN  ibuprofen  Tablet 600 milliGRAM(s) Oral every 6 hours PRN    VITALS:   T(F): 98.8  HR: 65  BP: 129/71  RR: 18  SpO2: --    LABS:                        12.9   23.99 )-----------( 304      ( 11 Apr 2018 21:32 )             37.0     04-11    138  |  96<L>  |  13  ----------------------------<  143<H>  3.8   |  30  |  1.0    Ca    8.6      11 Apr 2018 21:32      PT/INR - ( 11 Apr 2018 21:32 )   PT: 13.00 sec;   INR: 1.20 ratio         PTT - ( 11 Apr 2018 21:32 )  PTT:32.1 sec      Culture - Blood (collected 11 Apr 2018 21:32)  Source: .Blood Blood  Preliminary Report (13 Apr 2018 02:05):    No growth to date.    Culture - Blood (collected 11 Apr 2018 21:32)  Source: .Blood Blood  Preliminary Report (13 Apr 2018 02:05):    No growth to date.      RADIOLOGY:  CT Upper Extremity w/ IV Cont, Right (04.12.18)   No acute osseous abnormality.  Diffuse subcutaneous edema without focal drainable abscess.   No subcutaneous emphysema. 7 mm focal skin defect at the radial volar aspect of the proximal forearm.      PHYSICAL EXAM: Pt REFUSED PE  GEN: No acute distress  LUNGS:  HEART:   ABD: nt  EXT:   NEURO: AAOX3  SKIN:

## 2018-04-13 NOTE — PROGRESS NOTE ADULT - ASSESSMENT
IMPRESSION:  Superficial thrombophlebitis with surrounding abscess/cellulitis with possible fascitis with suppuration.  Doubt endocarditis or sepsis.  RECOMMENDATIONS:  Surgical debridement with deep tissue cultures pending.  HIV test.  [id number] q12h  Cefepime 2gm q8h.  F/U vancomycin trough.

## 2018-04-14 RX ADMIN — CEFEPIME 100 MILLIGRAM(S): 1 INJECTION, POWDER, FOR SOLUTION INTRAMUSCULAR; INTRAVENOUS at 06:13

## 2018-04-14 RX ADMIN — Medication 15 MILLIGRAM(S): at 18:18

## 2018-04-14 RX ADMIN — SODIUM CHLORIDE 3 MILLILITER(S): 9 INJECTION INTRAMUSCULAR; INTRAVENOUS; SUBCUTANEOUS at 21:58

## 2018-04-14 RX ADMIN — METHADONE HYDROCHLORIDE 5 MILLIGRAM(S): 40 TABLET ORAL at 09:09

## 2018-04-14 RX ADMIN — CEFEPIME 100 MILLIGRAM(S): 1 INJECTION, POWDER, FOR SOLUTION INTRAMUSCULAR; INTRAVENOUS at 13:50

## 2018-04-14 RX ADMIN — Medication 300 MILLIGRAM(S): at 17:45

## 2018-04-14 RX ADMIN — Medication 15 MILLIGRAM(S): at 17:45

## 2018-04-14 RX ADMIN — Medication 15 MILLIGRAM(S): at 06:13

## 2018-04-14 RX ADMIN — Medication 15 MILLIGRAM(S): at 07:48

## 2018-04-14 RX ADMIN — METHADONE HYDROCHLORIDE 5 MILLIGRAM(S): 40 TABLET ORAL at 21:15

## 2018-04-14 RX ADMIN — SODIUM CHLORIDE 3 MILLILITER(S): 9 INJECTION INTRAMUSCULAR; INTRAVENOUS; SUBCUTANEOUS at 13:15

## 2018-04-14 RX ADMIN — SODIUM CHLORIDE 3 MILLILITER(S): 9 INJECTION INTRAMUSCULAR; INTRAVENOUS; SUBCUTANEOUS at 06:09

## 2018-04-14 RX ADMIN — CEFEPIME 100 MILLIGRAM(S): 1 INJECTION, POWDER, FOR SOLUTION INTRAMUSCULAR; INTRAVENOUS at 21:15

## 2018-04-14 NOTE — PROGRESS NOTE ADULT - ASSESSMENT
36 yo male with pmhx of IVDA; presented with right forearm pain, erythema and discharging abscess    #) Cellulitis of right forearm  - daily dressing; keep forearm elevated  - Burn following: s/p I&D on 04/13/18  - ID consult appreciated: continue [id number] q12h and Cefepime 2gm q8h; on discharge change to Augmentin 875mg q12 and Levaquin 750mg q24 for 14 days  - blood cx: no growth   - f/u wound cultures  - f/u CT on forearm: Diffuse subcutaneous edema without focal drainable abscess  - pain control: continue IV toradol    #) Opoid withdrawal  - Detox following   - continue methadone taper    #) DVT ppx  - continue Lovenox    #) Disposition  -  consult  - detox program: patient expresses interest    #) Full code status 38 yo male with pmhx of IVDA; presented with right forearm pain, erythema and discharging abscess    #) Cellulitis of right forearm  - daily dressing; keep forearm elevated  - Burn following: s/p I&D on 04/13/18  - ID consult appreciated: continue [id number] q12h and Cefepime 2gm q8h; on discharge change to Augmentin 875mg q12 and Levaquin 750mg q24 for 14 days  - pt refused blood draw for Vanco trough   - blood cx: no growth   - f/u wound cultures  - f/u CT on forearm: Diffuse subcutaneous edema without focal drainable abscess  - pain control: continue IV toradol    #) Opoid withdrawal  - Detox following   - continue methadone taper    #) DVT ppx  - continue Lovenox    #) Disposition  -  consult  - detox program: patient expresses interest    #) Full code status 36 yo male with pmhx of IVDA; presented with right forearm pain, erythema and discharging abscess    #) Cellulitis of right forearm  - daily dressing; keep forearm elevated  - Burn following: s/p I&D on 04/13/18  - ID consult appreciated: continue [id number] q12h and Cefepime 2gm q8h; on discharge change to Augmentin 875mg q12 and Levaquin 750mg q24 for 14 days  - pt refused blood draw for Vanco trough   - blood cx: no growth   - f/u wound cultures  - f/u CT on forearm: Diffuse subcutaneous edema without focal drainable abscess  - pain control: continue IV toradol    #) Opoid withdrawal  - Detox following: Dr. Juares informed about patient possible taking drugs off floor  - continue methadone taper    #) DVT ppx  - continue Lovenox    #) Disposition  -  consult  - detox program: patient expresses interest    #) Full code status

## 2018-04-14 NOTE — PROGRESS NOTE ADULT - ATTENDING COMMENTS
38 yo male with pmhx of IVDA; presented with right forearm pain, erythema and discharging abscess.    1) Cellulitis with Abscess :   Per ID, change to IV Vanc, Cefepime. f/u cultures.   Burn attending to see the patient today and probably do an I&D today or tomorrow.  f/u CT of Rt Forearm.    2) Heroin withdrawal : Moderate.  Patient is anxious, sweaty, generalized pains and can feel that he is going into withdrawal. At home he uses 3 bags of heroin daily.  Will consult Dr. Dickey and manage the withdrawal in the way he suggests.     Will f/u
Discussed with ptXu
38 yo male with pmhx of IVDA; presented with right forearm pain, erythema and discharging abscess.    S: Appeared high at the time of exam.   Wants more methadone.   Not appearing sweaty/anxious.   It is not an actual withdrawal but his craving that he craves to fulfil.     Exam : pinpoint pupils. Forearm abscess drained. Dressing clean.     A/P :     1) Cellulitis with Abscess :   Per ID, IV Vanc, Cefepime. f/u cultures. PO Abx upon discharge.   s/p I&D by Burn   F/u Burn on Dispo    2) Heroin withdrawal : Moderate.  At home he uses 3 bags of heroin daily.  On Methadone protocol per Dr Dickey.   Patient expressed interest in inpatient detox  Besides patient is apparently homeless.   Will plan for Inpatient Detox on Monday ?     Today it was brought to my attention that patient goes out every 3 hours and comes back apparently high. Reason to suspect he goes out and abuses heroin. Even while he is on Methadone from our hospital. Even at the time of my exam he appeared high and pupils were pin point.   Confronted him with this and he denies. Instead started complaining "if you don't give me higher dose methadone I will sign myself out and go to [facility] where they take care of me"  Have asked nurses to tell the security staff to keep an unofficial eye on him and if witnessed abusing drugs, legal action must be taken against him.     Dr Dickey has been informed via text about this incident by our resident. Will await his response.     Will f/u .
38 yo male with pmhx of IVDA; presented with right forearm pain, erythema and discharging abscess.    1) Cellulitis with Abscess :   Per ID, IV Vanc, Cefepime. f/u cultures. PO Abx upon discharge.   s/p I&D by Burn   F/u Burn on Dispo    2) Heroin withdrawal : Moderate.  At home he uses 3 bags of heroin daily.  On Methadone protocol per Dr Dickey.   Patient expressed interest in inpatient detox  Besides patient is apparently homeless.   Will plan for Inpatient Detox on Monday ?     Will f/u .

## 2018-04-14 NOTE — PROGRESS NOTE ADULT - SUBJECTIVE AND OBJECTIVE BOX
SUBJECTIVE:    Patient is a 37y old Male who presents with a chief complaint of right forearm pain, erythema, and discharging lesion. (11 Apr 2018 22:55)    Currently admitted to medicine with the primary diagnosis of Abscess of forearm     Today is hospital day 3d. This morning he is resting comfortably in bed and reports no new issues or overnight events.     PAST MEDICAL & SURGICAL HISTORY  Intravenous drug abuse  No significant past surgical history    SOCIAL HISTORY:  Negative for smoking/alcohol/drug use.     ALLERGIES:  No Known Allergies    MEDICATIONS:  STANDING MEDICATIONS  cefepime  IVPB      cefepime  IVPB 2000 milliGRAM(s) IV Intermittent every 8 hours  enoxaparin Injectable 40 milliGRAM(s) SubCutaneous daily  ketorolac   Injectable 15 milliGRAM(s) IV Push two times a day  methadone    Tablet 5 milliGRAM(s) Oral every 12 hours  sodium chloride 0.9% lock flush 3 milliLiter(s) IV Push every 8 hours  vancomycin  IVPB      vancomycin  IVPB 1500 milliGRAM(s) IV Intermittent every 12 hours    PRN MEDICATIONS  hydrOXYzine hydrochloride 50 milliGRAM(s) Oral every 6 hours PRN  ibuprofen  Tablet 600 milliGRAM(s) Oral every 6 hours PRN    VITALS:   T(F): 98.7  HR: 55  BP: 122/58  RR: 17      Culture - Blood (collected 11 Apr 2018 21:32)  Source: .Blood Blood  Preliminary Report (13 Apr 2018 02:05):    No growth to date.    Culture - Blood (collected 11 Apr 2018 21:32)  Source: .Blood Blood  Preliminary Report (13 Apr 2018 02:05):    No growth to date.    PHYSICAL EXAM:  GEN: No acute distress  LUNGS: Clear to auscultation bilaterally   HEART: S1/S2 present. RRR.   ABD: Soft, non-tender, non-distended. Bowel sounds present  EXT: NC/NC/NE/2+PP/JAY  NEURO: AAOX3  SKIN: right forearm open crusted wound, covered with bandage SUBJECTIVE:    Patient is a 37y old Male who presents with a chief complaint of right forearm pain, erythema, and discharging lesion. (11 Apr 2018 22:55)    Currently admitted to medicine with the primary diagnosis of Abscess of forearm     Today is hospital day 3d. This morning he is resting comfortably in bed and reports no new issues or overnight events. Patient refused morning labs. Says he is still in pain.     PAST MEDICAL & SURGICAL HISTORY  Intravenous drug abuse  No significant past surgical history    SOCIAL HISTORY:  Negative for smoking/alcohol/drug use.     ALLERGIES:  No Known Allergies    MEDICATIONS:  STANDING MEDICATIONS  cefepime  IVPB      cefepime  IVPB 2000 milliGRAM(s) IV Intermittent every 8 hours  enoxaparin Injectable 40 milliGRAM(s) SubCutaneous daily  ketorolac   Injectable 15 milliGRAM(s) IV Push two times a day  methadone    Tablet 5 milliGRAM(s) Oral every 12 hours  sodium chloride 0.9% lock flush 3 milliLiter(s) IV Push every 8 hours  vancomycin  IVPB      vancomycin  IVPB 1500 milliGRAM(s) IV Intermittent every 12 hours    PRN MEDICATIONS  hydrOXYzine hydrochloride 50 milliGRAM(s) Oral every 6 hours PRN  ibuprofen  Tablet 600 milliGRAM(s) Oral every 6 hours PRN    VITALS:   T(F): 98.7  HR: 55  BP: 122/58  RR: 17      Culture - Blood (collected 11 Apr 2018 21:32)  Source: .Blood Blood  Preliminary Report (13 Apr 2018 02:05):    No growth to date.    Culture - Blood (collected 11 Apr 2018 21:32)  Source: .Blood Blood  Preliminary Report (13 Apr 2018 02:05):    No growth to date.    PHYSICAL EXAM:  GEN: No acute distress  LUNGS: Clear to auscultation bilaterally   HEART: S1/S2 present. RRR.   ABD: Soft, non-tender, non-distended. Bowel sounds present  EXT: NC/NC/NE/2+PP/JAY  NEURO: AAOX3  SKIN: right forearm open crusted wound, covered with bandage SUBJECTIVE:    Patient is a 37y old Male who presents with a chief complaint of right forearm pain, erythema, and discharging lesion. (11 Apr 2018 22:55)    Currently admitted to medicine with the primary diagnosis of Abscess of forearm     Today is hospital day 3d. This patient refused morning labs. He continues to complain of pain. Patient has been leaving the floor multiple times and found to have pin point pupils upon return.     PAST MEDICAL & SURGICAL HISTORY  Intravenous drug abuse  No significant past surgical history    SOCIAL HISTORY:  Negative for smoking/alcohol/drug use.     ALLERGIES:  No Known Allergies    MEDICATIONS:  STANDING MEDICATIONS  cefepime  IVPB      cefepime  IVPB 2000 milliGRAM(s) IV Intermittent every 8 hours  enoxaparin Injectable 40 milliGRAM(s) SubCutaneous daily  ketorolac   Injectable 15 milliGRAM(s) IV Push two times a day  methadone    Tablet 5 milliGRAM(s) Oral every 12 hours  sodium chloride 0.9% lock flush 3 milliLiter(s) IV Push every 8 hours  vancomycin  IVPB      vancomycin  IVPB 1500 milliGRAM(s) IV Intermittent every 12 hours    PRN MEDICATIONS  hydrOXYzine hydrochloride 50 milliGRAM(s) Oral every 6 hours PRN  ibuprofen  Tablet 600 milliGRAM(s) Oral every 6 hours PRN    VITALS:   T(F): 97.2  HR: 63  BP: 125/61  RR: 18  SpO2: --    LABS:    Culture - Blood (collected 11 Apr 2018 21:32)  Source: .Blood Blood  Preliminary Report (13 Apr 2018 02:05):    No growth to date.    Culture - Blood (collected 11 Apr 2018 21:32)  Source: .Blood Blood  Preliminary Report (13 Apr 2018 02:05):    No growth to date.      PHYSICAL EXAM:  GEN: No acute distress  LUNGS: Clear to auscultation bilaterally   HEART: S1/S2 present. RRR.   ABD: Soft, non-tender, non-distended. Bowel sounds present  EXT: NC/NC/NE/2+PP/JAY  NEURO: AAOX3  SKIN: right forearm open crusted wound, covered with bandage

## 2018-04-15 LAB
-  AMPICILLIN/SULBACTAM: SIGNIFICANT CHANGE UP
-  CEFAZOLIN: SIGNIFICANT CHANGE UP
-  CIPROFLOXACIN: SIGNIFICANT CHANGE UP
-  CLINDAMYCIN: SIGNIFICANT CHANGE UP
-  ERYTHROMYCIN: SIGNIFICANT CHANGE UP
-  GENTAMICIN: SIGNIFICANT CHANGE UP
-  LEVOFLOXACIN: SIGNIFICANT CHANGE UP
-  MOXIFLOXACIN(AEROBIC): SIGNIFICANT CHANGE UP
-  OXACILLIN: SIGNIFICANT CHANGE UP
-  PENICILLIN: SIGNIFICANT CHANGE UP
-  RIFAMPIN: SIGNIFICANT CHANGE UP
-  TETRACYCLINE: SIGNIFICANT CHANGE UP
-  TRIMETHOPRIM/SULFAMETHOXAZOLE: SIGNIFICANT CHANGE UP
-  VANCOMYCIN: SIGNIFICANT CHANGE UP
METHOD TYPE: SIGNIFICANT CHANGE UP

## 2018-04-15 RX ADMIN — SODIUM CHLORIDE 3 MILLILITER(S): 9 INJECTION INTRAMUSCULAR; INTRAVENOUS; SUBCUTANEOUS at 22:19

## 2018-04-15 RX ADMIN — Medication 300 MILLIGRAM(S): at 17:21

## 2018-04-15 RX ADMIN — Medication 50 MILLIGRAM(S): at 22:38

## 2018-04-15 RX ADMIN — METHADONE HYDROCHLORIDE 5 MILLIGRAM(S): 40 TABLET ORAL at 09:54

## 2018-04-15 RX ADMIN — Medication 300 MILLIGRAM(S): at 05:46

## 2018-04-15 RX ADMIN — CEFEPIME 100 MILLIGRAM(S): 1 INJECTION, POWDER, FOR SOLUTION INTRAMUSCULAR; INTRAVENOUS at 22:18

## 2018-04-15 RX ADMIN — CEFEPIME 100 MILLIGRAM(S): 1 INJECTION, POWDER, FOR SOLUTION INTRAMUSCULAR; INTRAVENOUS at 15:59

## 2018-04-15 RX ADMIN — SODIUM CHLORIDE 3 MILLILITER(S): 9 INJECTION INTRAMUSCULAR; INTRAVENOUS; SUBCUTANEOUS at 05:50

## 2018-04-15 RX ADMIN — Medication 15 MILLIGRAM(S): at 06:15

## 2018-04-15 RX ADMIN — Medication 15 MILLIGRAM(S): at 18:06

## 2018-04-15 RX ADMIN — CEFEPIME 100 MILLIGRAM(S): 1 INJECTION, POWDER, FOR SOLUTION INTRAMUSCULAR; INTRAVENOUS at 05:46

## 2018-04-15 RX ADMIN — Medication 15 MILLIGRAM(S): at 05:42

## 2018-04-15 RX ADMIN — Medication 15 MILLIGRAM(S): at 17:21

## 2018-04-15 NOTE — PROGRESS NOTE ADULT - ASSESSMENT
A/P: s/p I & D right forearm abscess    cont wound packing BID  Cont IV antibx  DVT GI Prophylaxis  Pain control  OT/PT  F/u cx

## 2018-04-15 NOTE — PROGRESS NOTE ADULT - SUBJECTIVE AND OBJECTIVE BOX
S: see the important note from yesterday.   Today patient not so high.       All other pertinent ROS negative.      Vital Signs Last 24 Hrs  T(C): 35.8 (15 Apr 2018 13:13), Max: 36.9 (14 Apr 2018 19:49)  T(F): 96.5 (15 Apr 2018 13:13), Max: 98.5 (14 Apr 2018 19:49)  HR: 57 (15 Apr 2018 13:13) (56 - 61)  BP: 141/84 (15 Apr 2018 13:13) (132/62 - 141/84)  BP(mean): --  RR: 18 (15 Apr 2018 13:13) (17 - 18)  SpO2: --  PHYSICAL EXAM:    Constitutional: NAD, awake and alert, well-developed  HEENT: PERR, EOMI, Normal Hearing, MMM  Neck: Soft and supple, No LAD, No JVD  Respiratory: Breath sounds are clear bilaterally, No wheezing, rales or rhonchi  Cardiovascular: S1 and S2, regular rate and rhythm, no Murmurs, gallops or rubs  Gastrointestinal: Bowel Sounds present, soft, nontender, nondistended, no guarding, no rebound  Extremities: Rt Forearm dressing c/d/i.    MEDICATIONS:  MEDICATIONS  (STANDING):  cefepime  IVPB      cefepime  IVPB 2000 milliGRAM(s) IV Intermittent every 8 hours  enoxaparin Injectable 40 milliGRAM(s) SubCutaneous daily  ketorolac   Injectable 15 milliGRAM(s) IV Push two times a day  sodium chloride 0.9% lock flush 3 milliLiter(s) IV Push every 8 hours  vancomycin  IVPB      vancomycin  IVPB 1500 milliGRAM(s) IV Intermittent every 12 hours      LABS: All Labs Reviewed:                Blood Culture: 04-13 @ 14:30  Organism Staphylococcus aureus  Gram Stain Blood -- Gram Stain --  Specimen Source .Abscess Arm - Right  Culture-Blood --    04-11 @ 21:32  Organism --  Gram Stain Blood -- Gram Stain --  Specimen Source .Blood Blood  Culture-Blood --        Radiology: reviewed

## 2018-04-15 NOTE — PROGRESS NOTE ADULT - SUBJECTIVE AND OBJECTIVE BOX
Patient is a 37y old  Male who presents with a chief complaint of right forearm pain, erythema, and discharging lesion. (11 Apr 2018 22:55)    No acute events overnight    Vital Signs Last 24 Hrs  T(C): 36.9 (15 Apr 2018 05:42), Max: 36.9 (14 Apr 2018 19:49)  T(F): 98.5 (15 Apr 2018 05:42), Max: 98.5 (14 Apr 2018 19:49)  HR: 61 (15 Apr 2018 05:42) (56 - 63)  BP: 133/79 (15 Apr 2018 05:42) (125/61 - 133/79)  BP(mean): --  RR: 17 (15 Apr 2018 05:42) (17 - 18)  SpO2: --  I&O's Summary      Meds:  MEDICATIONS  (STANDING):  cefepime  IVPB      cefepime  IVPB 2000 milliGRAM(s) IV Intermittent every 8 hours  enoxaparin Injectable 40 milliGRAM(s) SubCutaneous daily  ketorolac   Injectable 15 milliGRAM(s) IV Push two times a day  sodium chloride 0.9% lock flush 3 milliLiter(s) IV Push every 8 hours  vancomycin  IVPB      vancomycin  IVPB 1500 milliGRAM(s) IV Intermittent every 12 hours    MEDICATIONS  (PRN):  hydrOXYzine hydrochloride 50 milliGRAM(s) Oral every 6 hours PRN Anxiety  ibuprofen  Tablet 600 milliGRAM(s) Oral every 6 hours PRN Pain (avoid opioids and use this instead)        Culture - Abscess with Gram Stain (collected 13 Apr 2018 14:30)  Source: .Abscess Arm - Right  Preliminary Report (14 Apr 2018 22:13):    Rare Staphylococcus aureus      PE: AAO x 3    Full thickness wound to right forearm with granulation tissue, small serosang dc. decreased swelling, no erythema

## 2018-04-15 NOTE — PROGRESS NOTE ADULT - ASSESSMENT
38 yo male with pmhx of IVDA; presented with right forearm pain, erythema and discharging abscess.        A/P :     1) Cellulitis with Abscess :   Per ID, IV Vanc, Cefepime. f/u cultures -rare Staph aureus. Check with ID if Abx can be switched to PO tomorrow ?    s/p I&D by Burn. Dressing the wound BID. Appears to be healing well per nursing.   F/u Burn on Dispo    2) Heroin withdrawal : Moderate.  At home he uses 3 bags of heroin daily.  On Methadone protocol per Dr Dickey.   Patient expressed interest in inpatient detox earlier.   Besides patient is apparently homeless.   If cleared by Burn & ID, check with Dr Dickey how many more days of his detox remain and decide dispo accordingly.   Today patient not going out as much but there is still suspicion that he goes out to shoot heroin while he has been here.     Important note from 4/14:  Today it was brought to my attention that patient goes out every 3 hours and comes back apparently high. Reason to suspect he goes out and abuses heroin. Even while he is on Methadone from our hospital. Even at the time of my exam he appeared high and pupils were pin point.   Confronted him with this and he denies. Instead started complaining "if you don't give me higher dose methadone I will sign myself out and go to [facility] where they take care of me"  Have asked nurses to tell the security staff to keep an unofficial eye on him and if witnessed abusing drugs, legal action must be taken against him.     Dr Dickey has been informed via text about this incident by our resident. Will await his response.     Will f/u .

## 2018-04-16 VITALS
TEMPERATURE: 98 F | DIASTOLIC BLOOD PRESSURE: 86 MMHG | RESPIRATION RATE: 17 BRPM | SYSTOLIC BLOOD PRESSURE: 142 MMHG | HEART RATE: 60 BPM

## 2018-04-16 RX ADMIN — Medication 15 MILLIGRAM(S): at 06:00

## 2018-04-16 RX ADMIN — Medication 15 MILLIGRAM(S): at 05:26

## 2018-04-16 RX ADMIN — CEFEPIME 100 MILLIGRAM(S): 1 INJECTION, POWDER, FOR SOLUTION INTRAMUSCULAR; INTRAVENOUS at 05:26

## 2018-04-16 RX ADMIN — Medication 300 MILLIGRAM(S): at 05:25

## 2018-04-16 NOTE — CHART NOTE - NSCHARTNOTEFT_GEN_A_CORE
I came to examine the patient but realized that he eloped this afternoon     Looks like patient will continue to use heroin.     He stayed inpatient only until he needed the treatment for the abscess and then eloped.

## 2018-04-17 LAB
CULTURE RESULTS: SIGNIFICANT CHANGE UP
CULTURE RESULTS: SIGNIFICANT CHANGE UP
SPECIMEN SOURCE: SIGNIFICANT CHANGE UP
SPECIMEN SOURCE: SIGNIFICANT CHANGE UP

## 2018-04-19 LAB
CULTURE RESULTS: SIGNIFICANT CHANGE UP
ORGANISM # SPEC MICROSCOPIC CNT: SIGNIFICANT CHANGE UP
ORGANISM # SPEC MICROSCOPIC CNT: SIGNIFICANT CHANGE UP
SPECIMEN SOURCE: SIGNIFICANT CHANGE UP

## 2018-04-27 DIAGNOSIS — Z91.19 PATIENT'S NONCOMPLIANCE WITH OTHER MEDICAL TREATMENT AND REGIMEN: ICD-10-CM

## 2018-04-27 DIAGNOSIS — I80.8 PHLEBITIS AND THROMBOPHLEBITIS OF OTHER SITES: ICD-10-CM

## 2018-04-27 DIAGNOSIS — R50.9 FEVER, UNSPECIFIED: ICD-10-CM

## 2018-04-27 DIAGNOSIS — F17.200 NICOTINE DEPENDENCE, UNSPECIFIED, UNCOMPLICATED: ICD-10-CM

## 2018-04-27 DIAGNOSIS — Z53.29 PROCEDURE AND TREATMENT NOT CARRIED OUT BECAUSE OF PATIENT'S DECISION FOR OTHER REASONS: ICD-10-CM

## 2018-04-27 DIAGNOSIS — Z59.0 HOMELESSNESS: ICD-10-CM

## 2018-04-27 DIAGNOSIS — L03.113 CELLULITIS OF RIGHT UPPER LIMB: ICD-10-CM

## 2018-04-27 DIAGNOSIS — F11.23 OPIOID DEPENDENCE WITH WITHDRAWAL: ICD-10-CM

## 2018-04-27 DIAGNOSIS — M72.9 FIBROBLASTIC DISORDER, UNSPECIFIED: ICD-10-CM

## 2018-04-27 DIAGNOSIS — L02.413 CUTANEOUS ABSCESS OF RIGHT UPPER LIMB: ICD-10-CM

## 2018-04-27 SDOH — ECONOMIC STABILITY - HOUSING INSECURITY: HOMELESSNESS: Z59.0

## 2018-04-29 DIAGNOSIS — L98.498 NON-PRESSURE CHRONIC ULCER OF SKIN OF OTHER SITES WITH OTHER SPECIFIED SEVERITY: ICD-10-CM

## 2018-04-29 DIAGNOSIS — L02.413 CUTANEOUS ABSCESS OF RIGHT UPPER LIMB: ICD-10-CM

## 2018-04-29 DIAGNOSIS — B95.61 METHICILLIN SUSCEPTIBLE STAPHYLOCOCCUS AUREUS INFECTION AS THE CAUSE OF DISEASES CLASSIFIED ELSEWHERE: ICD-10-CM

## 2018-05-01 NOTE — DISCHARGE NOTE ADULT - HOSPITAL COURSE
38 yo male with pmhx of IVDA; presented with right forearm pain, erythema and discharging abscess. After the abscess was drained he kept going out while in hospital shooting up heroin apparently as per hospital staff.   When confronted he would get agitated.   He was not happy with our methadone withdrawal protocol.  Eventually ended up Leaving Against Medical Advice. Eloped

## 2018-05-01 NOTE — DISCHARGE NOTE ADULT - PATIENT PORTAL LINK FT
You can access the [facility] Patient Portal, offered by [facility], by registering with the following website: [email or web address]

## 2018-05-01 NOTE — DISCHARGE NOTE ADULT - CARE PLAN
Principal Discharge DX:	Abscess of forearm  Goal:	s/p I & D  Assessment and plan of treatment:	Patient left AMA  Secondary Diagnosis:	Heroin abuse  Goal:	rehab  Assessment and plan of treatment:	patient left AMA

## 2018-06-09 ENCOUNTER — INPATIENT (INPATIENT)
Facility: HOSPITAL | Age: 38
LOS: 1 days | Discharge: LEFT BEFORE DISCHARGE | End: 2018-06-11
Attending: INTERNAL MEDICINE | Admitting: INTERNAL MEDICINE
Payer: MEDICAID

## 2018-06-09 VITALS — HEIGHT: 73 IN | WEIGHT: 169.98 LBS

## 2018-06-09 RX ORDER — SODIUM CHLORIDE 9 MG/ML
1000 INJECTION INTRAMUSCULAR; INTRAVENOUS; SUBCUTANEOUS ONCE
Qty: 0 | Refills: 0 | Status: COMPLETED | OUTPATIENT
Start: 2018-06-09 | End: 2018-06-09

## 2018-06-09 RX ADMIN — SODIUM CHLORIDE 1000 MILLILITER(S): 9 INJECTION INTRAMUSCULAR; INTRAVENOUS; SUBCUTANEOUS at 23:53

## 2018-06-09 NOTE — ED ADULT TRIAGE NOTE - CHIEF COMPLAINT QUOTE
patient states "I got an infection on my arm" left underside of forearm, patient states he use IV heroin, on arrival patient has area dressed with layers of duct tape

## 2018-06-09 NOTE — ED ADULT NURSE NOTE - WOUND TYPE
patient here for evaluation of possible infection on left forearm, patient states he use IV heroin, area wrapped with duct tape PTA

## 2018-06-09 NOTE — ED PROVIDER NOTE - OBJECTIVE STATEMENT
37 y/o WM H/O heroin abuse, IVDA, presents with left forearm infection for 1.5 weeks. States it is not getting better and has a foul-smelling discharge.

## 2018-06-09 NOTE — ED PROVIDER NOTE - NS ED ROS FT
Review of Systems:  	•	CONSTITUTIONAL - no fever, no diaphoresis, no weight change  	•	SKIN - no rash  	•	HEMATOLOGIC - no bleeding, no bruising  	•	EYES - no eye pain, no blurred vision  	•	ENT - no change in hearing, no pain  	•	RESPIRATORY - no shortness of breath, no cough  	•	CARDIAC - no chest pain, no palpitations  	•	GI - no abd pain, no nausea, no vomiting, no diarrhea, no constipation, no bleeding  	•	GENITO-URINARY - no discharge, no dysuria; no hematuria, LMP-   	•	ENDO - no polydypsia, no polyurea, no heat/no cold intolerance  	•	MUSCULOSKELETAL - no joint paint, + swelling, + redness  	•	NEUROLOGIC - no weakness, no headache, no anesthesia, no paresthesias  	•	PSYCH - no anxiety, non suicidal, non homicidal, no hallucination, no depression  	•	ALLERGY - no rhinitis

## 2018-06-09 NOTE — ED PROVIDER NOTE - PHYSICAL EXAMINATION
VITAL SIGNS: I have reviewed nursing notes and confirm.  CONSTITUTIONAL: Well-developed; well-nourished; in no acute distress.  SKIN: Skin exam is warm and dry, no acute rash.  HEAD: Normocephalic; atraumatic.  EYES: PERRL, EOM intact; conjunctiva and sclera clear.  ENT: No nasal discharge; airway clear. Throat clear.  NECK: Supple; non tender.  No lymphadenopathy.  CARD: S1, S2 normal; no murmurs, gallops, or rubs. Regular rate and rhythm.  RESP: No wheezes, rales or rhonchi.  ABD: Normal bowel sounds; soft; non-distended; non-tender; no hepatosplenomegaly.  EXT: Normal ROM. No clubbing, cyanosis or edema +ulcer left forearm with tenderness and cellulitis  NEURO: Alert, oriented. Grossly unremarkable. No focal deficits.  PSYCH: Cooperative, appropriate.

## 2018-06-09 NOTE — ED PROVIDER NOTE - PROGRESS NOTE DETAILS
Dr. Summers accepts admission. Dr. Summers accepts admission. Spoke to surgical PA regarding consult.

## 2018-06-09 NOTE — ED PROVIDER NOTE - CARE PLAN
Principal Discharge DX:	Infected ulcer of skin, unspecified ulcer stage  Secondary Diagnosis:	Cellulitis

## 2018-06-10 DIAGNOSIS — L98.499 NON-PRESSURE CHRONIC ULCER OF SKIN OF OTHER SITES WITH UNSPECIFIED SEVERITY: ICD-10-CM

## 2018-06-10 DIAGNOSIS — L03.114 CELLULITIS OF LEFT UPPER LIMB: ICD-10-CM

## 2018-06-10 DIAGNOSIS — M54.9 DORSALGIA, UNSPECIFIED: ICD-10-CM

## 2018-06-10 DIAGNOSIS — F19.10 OTHER PSYCHOACTIVE SUBSTANCE ABUSE, UNCOMPLICATED: ICD-10-CM

## 2018-06-10 LAB
ALBUMIN SERPL ELPH-MCNC: 4.3 G/DL — SIGNIFICANT CHANGE UP (ref 3.5–5.2)
ALP SERPL-CCNC: 124 U/L — HIGH (ref 30–115)
ALT FLD-CCNC: 24 U/L — SIGNIFICANT CHANGE UP (ref 0–41)
ANION GAP SERPL CALC-SCNC: 13 MMOL/L — SIGNIFICANT CHANGE UP (ref 7–14)
AST SERPL-CCNC: 22 U/L — SIGNIFICANT CHANGE UP (ref 0–41)
BASOPHILS # BLD AUTO: 0.08 K/UL — SIGNIFICANT CHANGE UP (ref 0–0.2)
BASOPHILS NFR BLD AUTO: 0.6 % — SIGNIFICANT CHANGE UP (ref 0–1)
BILIRUB SERPL-MCNC: 0.4 MG/DL — SIGNIFICANT CHANGE UP (ref 0.2–1.2)
BUN SERPL-MCNC: 12 MG/DL — SIGNIFICANT CHANGE UP (ref 10–20)
CALCIUM SERPL-MCNC: 9.7 MG/DL — SIGNIFICANT CHANGE UP (ref 8.5–10.1)
CHLORIDE SERPL-SCNC: 97 MMOL/L — LOW (ref 98–110)
CO2 SERPL-SCNC: 30 MMOL/L — SIGNIFICANT CHANGE UP (ref 17–32)
CREAT SERPL-MCNC: 1.1 MG/DL — SIGNIFICANT CHANGE UP (ref 0.7–1.5)
EOSINOPHIL # BLD AUTO: 0.13 K/UL — SIGNIFICANT CHANGE UP (ref 0–0.7)
EOSINOPHIL NFR BLD AUTO: 1 % — SIGNIFICANT CHANGE UP (ref 0–8)
GLUCOSE SERPL-MCNC: 119 MG/DL — HIGH (ref 70–99)
HCT VFR BLD CALC: 42.4 % — SIGNIFICANT CHANGE UP (ref 42–52)
HGB BLD-MCNC: 14.4 G/DL — SIGNIFICANT CHANGE UP (ref 14–18)
IMM GRANULOCYTES NFR BLD AUTO: 0.4 % — HIGH (ref 0.1–0.3)
LACTATE SERPL-SCNC: 0.8 MMOL/L — SIGNIFICANT CHANGE UP (ref 0.5–2.2)
LYMPHOCYTES # BLD AUTO: 2.98 K/UL — SIGNIFICANT CHANGE UP (ref 1.2–3.4)
LYMPHOCYTES # BLD AUTO: 22.6 % — SIGNIFICANT CHANGE UP (ref 20.5–51.1)
MCHC RBC-ENTMCNC: 28.5 PG — SIGNIFICANT CHANGE UP (ref 27–31)
MCHC RBC-ENTMCNC: 34 G/DL — SIGNIFICANT CHANGE UP (ref 32–37)
MCV RBC AUTO: 83.8 FL — SIGNIFICANT CHANGE UP (ref 80–94)
MONOCYTES # BLD AUTO: 0.9 K/UL — HIGH (ref 0.1–0.6)
MONOCYTES NFR BLD AUTO: 6.8 % — SIGNIFICANT CHANGE UP (ref 1.7–9.3)
NEUTROPHILS # BLD AUTO: 9.06 K/UL — HIGH (ref 1.4–6.5)
NEUTROPHILS NFR BLD AUTO: 68.6 % — SIGNIFICANT CHANGE UP (ref 42.2–75.2)
NRBC # BLD: 0 /100 WBCS — SIGNIFICANT CHANGE UP (ref 0–0)
PLATELET # BLD AUTO: 313 K/UL — SIGNIFICANT CHANGE UP (ref 130–400)
POTASSIUM SERPL-MCNC: 4.2 MMOL/L — SIGNIFICANT CHANGE UP (ref 3.5–5)
POTASSIUM SERPL-SCNC: 4.2 MMOL/L — SIGNIFICANT CHANGE UP (ref 3.5–5)
PROT SERPL-MCNC: 8.2 G/DL — HIGH (ref 6–8)
RBC # BLD: 5.06 M/UL — SIGNIFICANT CHANGE UP (ref 4.7–6.1)
RBC # FLD: 13.4 % — SIGNIFICANT CHANGE UP (ref 11.5–14.5)
SODIUM SERPL-SCNC: 140 MMOL/L — SIGNIFICANT CHANGE UP (ref 135–146)
WBC # BLD: 13.2 K/UL — HIGH (ref 4.8–10.8)
WBC # FLD AUTO: 13.2 K/UL — HIGH (ref 4.8–10.8)

## 2018-06-10 PROCEDURE — 99221 1ST HOSP IP/OBS SF/LOW 40: CPT

## 2018-06-10 RX ORDER — VANCOMYCIN HCL 1 G
1000 VIAL (EA) INTRAVENOUS ONCE
Qty: 0 | Refills: 0 | Status: COMPLETED | OUTPATIENT
Start: 2018-06-10 | End: 2018-06-10

## 2018-06-10 RX ORDER — OXYCODONE HYDROCHLORIDE 5 MG/1
30 TABLET ORAL
Qty: 0 | Refills: 0 | Status: DISCONTINUED | OUTPATIENT
Start: 2018-06-10 | End: 2018-06-11

## 2018-06-10 RX ORDER — NALOXONE HYDROCHLORIDE 4 MG/.1ML
1 SPRAY NASAL ONCE
Qty: 0 | Refills: 0 | Status: COMPLETED | OUTPATIENT
Start: 2018-06-10 | End: 2018-06-10

## 2018-06-10 RX ORDER — VANCOMYCIN HCL 1 G
1000 VIAL (EA) INTRAVENOUS EVERY 12 HOURS
Qty: 0 | Refills: 0 | Status: DISCONTINUED | OUTPATIENT
Start: 2018-06-10 | End: 2018-06-11

## 2018-06-10 RX ORDER — ONDANSETRON 8 MG/1
4 TABLET, FILM COATED ORAL EVERY 6 HOURS
Qty: 0 | Refills: 0 | Status: DISCONTINUED | OUTPATIENT
Start: 2018-06-10 | End: 2018-06-11

## 2018-06-10 RX ADMIN — NALOXONE HYDROCHLORIDE 1 MILLIGRAM(S): 4 SPRAY NASAL at 05:23

## 2018-06-10 RX ADMIN — ONDANSETRON 4 MILLIGRAM(S): 8 TABLET, FILM COATED ORAL at 05:42

## 2018-06-10 RX ADMIN — OXYCODONE HYDROCHLORIDE 30 MILLIGRAM(S): 5 TABLET ORAL at 19:05

## 2018-06-10 RX ADMIN — Medication 250 MILLIGRAM(S): at 19:36

## 2018-06-10 RX ADMIN — Medication 250 MILLIGRAM(S): at 01:20

## 2018-06-10 NOTE — H&P ADULT - NSHPLABSRESULTS_GEN_ALL_CORE
T(F): , Max: 98.5 (06-09-18 @ 22:18)  HR: 57 (06-10-18 @ 02:26) (57 - 74)  BP: 143/71 (06-10-18 @ 02:26)  RR: 17 (06-10-18 @ 02:26)  SpO2: 97% (06-10-18 @ 01:30)81.9  General: Lethargic, somnelent, barely arrousable  Cardiovascular: S1, S2  Gastrointestinal: Soft, Non-tender, Non-distended  Respiratory: Good air entry bilaterally  Musculoskeletal: Moves all extremities  Lymphatic: No edema  Neurologic: Pupils pinpoint  Dermatologic: extensive tattoos                          14.4   13.20 )-----------( 313      ( 10 Nathaniel 2018 00:01 )             42.4     06-10    140  |  97<L>  |  12  ----------------------------<  119<H>  4.2   |  30  |  1.1    Ca    9.7      10 Nathaniel 2018 00:01    TPro  8.2<H>  /  Alb  4.3  /  TBili  0.4  /  DBili  x   /  AST  22  /  ALT  24  /  AlkPhos  124<H>  06-10

## 2018-06-10 NOTE — CONSULT NOTE ADULT - PROBLEM SELECTOR RECOMMENDATION 9
admit to medicine  IV ABX  f/u labs  LWC-- clean with saline and cover with silvadene and DSD q12h for now.  surgical attending and resident to evaluate for debridement

## 2018-06-10 NOTE — CONSULT NOTE ADULT - ASSESSMENT
39yo M IVDA presenting to ED with necrotic/infected ulcer to left forearm after injection to that site and self I&D with razor.

## 2018-06-10 NOTE — H&P ADULT - HISTORY OF PRESENT ILLNESS
39 Y/O M w/ known IVDA hx p/w L painful forearm ulcer that began as an abscess that patient cut open on his own. About 4 days ago wound became red and painful. Denies fevers or chills.

## 2018-06-10 NOTE — CONSULT NOTE ADULT - SUBJECTIVE AND OBJECTIVE BOX
HPI:  Patient is a 37yo who has been abusing IV heroin for the last two years and developed an abscess to his left forearm about 1.5 weeks ago which he incised and drained using a "clean razor" with drainage of pus. Over the last 3 days patient states the site became infected described as formation of an ulcer with necrotic tissue and pain. Patient reports associated fever yesterday had temp of 102 and chills. Patient reports  hx of simliar in past. Patient states he is prescribed as oxycodone for chronic low back pain. Patient also reports abusing alcohol and wants to detox.     PAST MEDICAL & SURGICAL HISTORY:  Back pain  Intravenous drug abuse  No significant past surgical history      Allergies  No Known Allergies      MEDICATIONS--home  oxycodone 30mg q4h  Neurontin 300mg q8h      General:	as above  Skin:                 as above  Respiratory and Thorax: no cough, wheeze,  sob  Cardiovascular:	no chest pain, palpitations, dizziness  Gastrointestinal:	no nausea, vomiting, diarrhea, abd pain  Genitourinary:	no dysuria, hematuria      Vital Signs Last 24 Hrs  T(F): 98.5 (09 Jun 2018 22:18), Max: 98.5 (09 Jun 2018 22:18)  HR: 68 (09 Jun 2018 22:18) (68 - 68)  BP: 136/72 (09 Jun 2018 22:18) (136/72 - 136/72)  RR: 19 (09 Jun 2018 22:18) (19 - 19)  SpO2: 97% (09 Jun 2018 22:18) (97% - 97%)    PHYSICAL EXAM:      Constitutional: A&Ox4, NAD  Respiratory: cta b/l  Cardiovascular: s1 s2 rrr  Gastrointestinal: soft nt  nd + bs no rebound or guarding  Genitourinary: no cva tenderness  Extremities: normal rom, no edema, calf tenderness  Neurological: no focal deficits  Skin: +necrotic ulcer to left forearm, no cellulitis/swelling,     labs--pending

## 2018-06-11 VITALS
TEMPERATURE: 98 F | DIASTOLIC BLOOD PRESSURE: 67 MMHG | HEART RATE: 52 BPM | SYSTOLIC BLOOD PRESSURE: 150 MMHG | RESPIRATION RATE: 16 BRPM

## 2018-06-11 DIAGNOSIS — D72.829 ELEVATED WHITE BLOOD CELL COUNT, UNSPECIFIED: ICD-10-CM

## 2018-06-11 LAB
ANION GAP SERPL CALC-SCNC: 12 MMOL/L — SIGNIFICANT CHANGE UP (ref 7–14)
BUN SERPL-MCNC: 14 MG/DL — SIGNIFICANT CHANGE UP (ref 10–20)
CALCIUM SERPL-MCNC: 9.2 MG/DL — SIGNIFICANT CHANGE UP (ref 8.5–10.1)
CHLORIDE SERPL-SCNC: 101 MMOL/L — SIGNIFICANT CHANGE UP (ref 98–110)
CO2 SERPL-SCNC: 27 MMOL/L — SIGNIFICANT CHANGE UP (ref 17–32)
CREAT SERPL-MCNC: 0.9 MG/DL — SIGNIFICANT CHANGE UP (ref 0.7–1.5)
GLUCOSE SERPL-MCNC: 87 MG/DL — SIGNIFICANT CHANGE UP (ref 70–99)
HCT VFR BLD CALC: 42.1 % — SIGNIFICANT CHANGE UP (ref 42–52)
HGB BLD-MCNC: 13.7 G/DL — LOW (ref 14–18)
MCHC RBC-ENTMCNC: 27.6 PG — SIGNIFICANT CHANGE UP (ref 27–31)
MCHC RBC-ENTMCNC: 32.5 G/DL — SIGNIFICANT CHANGE UP (ref 32–37)
MCV RBC AUTO: 84.7 FL — SIGNIFICANT CHANGE UP (ref 80–94)
NRBC # BLD: 0 /100 WBCS — SIGNIFICANT CHANGE UP (ref 0–0)
PLATELET # BLD AUTO: 203 K/UL — SIGNIFICANT CHANGE UP (ref 130–400)
POTASSIUM SERPL-MCNC: 4.5 MMOL/L — SIGNIFICANT CHANGE UP (ref 3.5–5)
POTASSIUM SERPL-SCNC: 4.5 MMOL/L — SIGNIFICANT CHANGE UP (ref 3.5–5)
RBC # BLD: 4.97 M/UL — SIGNIFICANT CHANGE UP (ref 4.7–6.1)
RBC # FLD: 13.3 % — SIGNIFICANT CHANGE UP (ref 11.5–14.5)
SODIUM SERPL-SCNC: 140 MMOL/L — SIGNIFICANT CHANGE UP (ref 135–146)
WBC # BLD: 10.46 K/UL — SIGNIFICANT CHANGE UP (ref 4.8–10.8)
WBC # FLD AUTO: 10.46 K/UL — SIGNIFICANT CHANGE UP (ref 4.8–10.8)

## 2018-06-11 RX ORDER — AMPICILLIN SODIUM AND SULBACTAM SODIUM 250; 125 MG/ML; MG/ML
INJECTION, POWDER, FOR SUSPENSION INTRAMUSCULAR; INTRAVENOUS
Qty: 0 | Refills: 0 | Status: DISCONTINUED | OUTPATIENT
Start: 2018-06-11 | End: 2018-06-11

## 2018-06-11 RX ORDER — AMPICILLIN SODIUM AND SULBACTAM SODIUM 250; 125 MG/ML; MG/ML
3 INJECTION, POWDER, FOR SUSPENSION INTRAMUSCULAR; INTRAVENOUS ONCE
Qty: 0 | Refills: 0 | Status: COMPLETED | OUTPATIENT
Start: 2018-06-11 | End: 2018-06-11

## 2018-06-11 RX ORDER — AMPICILLIN SODIUM AND SULBACTAM SODIUM 250; 125 MG/ML; MG/ML
3 INJECTION, POWDER, FOR SUSPENSION INTRAMUSCULAR; INTRAVENOUS EVERY 6 HOURS
Qty: 0 | Refills: 0 | Status: DISCONTINUED | OUTPATIENT
Start: 2018-06-11 | End: 2018-06-11

## 2018-06-11 RX ADMIN — OXYCODONE HYDROCHLORIDE 30 MILLIGRAM(S): 5 TABLET ORAL at 06:35

## 2018-06-11 RX ADMIN — Medication 250 MILLIGRAM(S): at 05:56

## 2018-06-11 RX ADMIN — AMPICILLIN SODIUM AND SULBACTAM SODIUM 200 GRAM(S): 250; 125 INJECTION, POWDER, FOR SUSPENSION INTRAMUSCULAR; INTRAVENOUS at 10:12

## 2018-06-11 RX ADMIN — OXYCODONE HYDROCHLORIDE 30 MILLIGRAM(S): 5 TABLET ORAL at 06:03

## 2018-06-11 NOTE — PROGRESS NOTE ADULT - SUBJECTIVE AND OBJECTIVE BOX
LUCY TIMOTHY  38y  Male      Patient is a 38y old  Male who presents with a chief complaint of L arm ulcer (10 Nathaniel 2018 04:53)      INTERVAL HPI/OVERNIGHT EVENTS:    patient admitted for IV antibiotics for left arm abscess   hx of IVDA    Vital Signs Last 24 Hrs  T(C): 36.7 (11 Jun 2018 05:40), Max: 36.7 (10 Nathaniel 2018 21:57)  T(F): 98.1 (11 Jun 2018 05:40), Max: 98.1 (10 Nathaniel 2018 21:57)  HR: 50 (11 Jun 2018 06:54) (50 - 57)  BP: 102/59 (11 Jun 2018 06:54) (102/59 - 166/90)  RR: 16 (11 Jun 2018 05:40) (16 - 16)    PHYSICAL EXAM:  GENERAL: NAD, well-groomed, well-developed  HEAD:  Atraumatic, Normocephalic  EYES: EOMI, PERRLA, conjunctiva and sclera clear  NERVOUS SYSTEM:  Alert & Oriented X 4, Good concentration; Motor Strength 5/5 B/L upper and lower extremities; DTRs 2+ intact and symmetric  CHEST/LUNG: Clear to percussion bilaterally; No rales, rhonchi, wheezing, or rubs  HEART: Regular rate and rhythm; No murmurs, rubs, or gallops  ABDOMEN: Soft, Nontender, Nondistended; Bowel sounds present  EXTREMITIES:  2+ Peripheral Pulses, No clubbing, cyanosis, or edema  SKIN: No rashes or lesions    LAB:                        14.4   13.20 )-----------( 313      ( 10 Nathaniel 2018 00:01 )             42.4     06-10    140  |  97<L>  |  12  ----------------------------<  119<H>  4.2   |  30  |  1.1    Ca    9.7      10 Nathaniel 2018 00:01    TPro  8.2<H>  /  Alb  4.3  /  TBili  0.4  /  DBili  x   /  AST  22  /  ALT  24  /  AlkPhos  124<H>  06-10      LIVER FUNCTIONS - ( 10 Nathaniel 2018 00:01 )  Alb: 4.3 g/dL / Pro: 8.2 g/dL / ALK PHOS: 124 U/L / ALT: 24 U/L / AST: 22 U/L / GGT: x           RADIOLOGY:    Imaging Personally Reviewed:  [ Y] YES  [ ] NO     Xray Forearm, Left (06.09.18 @ 23:54)   Impression:  Soft tissue swelling with a tiny radiodensity for which a foreign body   cannot be excluded.      Xray Chest 1 View- PORTABLE-Routine (04.13.18 @ 07:36) >  Impression:      No radiographic evidence of acute cardiopulmonary disease.        HEALTH ISSUES - PROBLEM Dx:  Intravenous drug abuse: Intravenous drug abuse  Back pain, unspecified back location, unspecified back pain laterality, unspecified chronicity: Back pain, unspecified back location, unspecified back pain laterality, unspecified chronicity  Cellulitis of left upper extremity: Cellulitis of left upper extremity  Infected ulcer of skin, unspecified ulcer stage: Infected ulcer of skin, unspecified ulcer stage        MEDS:  ampicillin/sulbactam  IVPB      ampicillin/sulbactam  IVPB 3 Gram(s) IV Intermittent every 6 hours  LORazepam     Tablet 1 milliGRAM(s) Oral every 2 hours PRN  ondansetron Injectable 4 milliGRAM(s) IV Push every 6 hours PRN  oxyCODONE    IR 30 milliGRAM(s) Oral four times a day PRN  silver sulfADIAZINE 1% Cream 1 Application(s) Topical two times a day

## 2018-06-11 NOTE — CONSULT NOTE ADULT - ASSESSMENT
IMPRESSION  Pt with hx IVDU admitted with arm abscess that evolved into an ulcer now growing Group A Strep and MIRIAM    On vancomycin 1000 mg IV Intermittent every 12 hours    wbc 13.2    HX NKDA    Left forearm Xray Soft tissue swelling with a tiny radiodensity for which a foreign body   cannot be excluded.    SUGGESTIONs  Await blood cultures,  D/C Vancomycin  Unasyn 3 gm q6h IVPB  Repeat  white blood cell count  Surgical consult to evaluate for foreign body

## 2018-06-11 NOTE — CONSULT NOTE ADULT - SUBJECTIVE AND OBJECTIVE BOX
TIMOTHY AYALA 38yMalePatient is a 38y old  Male who presents with a chief complaint of L arm ulcer (10 Nathaniel 2018 04:53)      Patient has history of:  No Known Allergies      Adult/Nursing Home residence    INFECTED ULCER OF SKIN  ^DETOX FROM ETOH HEROIN AND INFE ARM  No pertinent family history in first degree relatives  Handoff  MEWS Score  Back pain  Intravenous drug abuse  Infected ulcer of skin, unspecified ulcer stage  Intravenous drug abuse  Back pain, unspecified back location, unspecified back pain laterality, unspecified chronicity  Cellulitis of left upper extremity  Infected ulcer of skin, unspecified ulcer stage  No significant past surgical history  DETOX FROM ETOH HEROIN AND INFE ARM  54  Cellulitis        Patient treated with:  vancomycin  IVPB 1000 milliGRAM(s) IV Intermittent every 12 hours        PHYSICAL EXAM  T(F): 98.1 (06-11-18 @ 05:40), Max: 98.1 (06-10-18 @ 21:57)  HR: 50 (06-11-18 @ 06:54) (50 - 57)  BP: 102/59 (06-11-18 @ 06:54) (102/59 - 166/90)  RR: 16 (06-11-18 @ 05:40) (16 - 16)  SpO2: --  Daily     Daily   HEENT: normal, no nuchal rigidity  Cor: RSR Nl S1 S2  Lungs: clear  Decreased breath sounds at bases    Abdomen: Nontender, Nl BS,     Ext: No clubbing,cyanosis or edema    LAB & RADIOLOGIC RESULTS:                        14.4   13.20 )-----------( 313      ( 10 Nathaniel 2018 00:01 )             42.4         06-10    140  |  97<L>  |  12  ----------------------------<  119<H>  4.2   |  30  |  1.1      TPro  8.2<H>  /  Alb  4.3  /  TBili  0.4  /  DBili  x   /  AST  22  /  ALT  24  /  AlkPhos  124<H>  06-10

## 2018-06-11 NOTE — PROGRESS NOTE ADULT - PROBLEM SELECTOR PLAN 1
-abscess (staph aureus; susceptibility Pending)  ID follow up noted   change antibiotics to IV unasyn -abscess (staph aureus + Group A strep; susceptibility Pending)  ID follow up noted   change antibiotics to IV unasyn

## 2018-06-14 DIAGNOSIS — L03.114 CELLULITIS OF LEFT UPPER LIMB: ICD-10-CM

## 2018-06-14 DIAGNOSIS — F11.10 OPIOID ABUSE, UNCOMPLICATED: ICD-10-CM

## 2018-06-14 DIAGNOSIS — L02.414 CUTANEOUS ABSCESS OF LEFT UPPER LIMB: ICD-10-CM

## 2018-06-14 DIAGNOSIS — F17.200 NICOTINE DEPENDENCE, UNSPECIFIED, UNCOMPLICATED: ICD-10-CM

## 2018-06-14 DIAGNOSIS — D72.829 ELEVATED WHITE BLOOD CELL COUNT, UNSPECIFIED: ICD-10-CM

## 2018-06-14 DIAGNOSIS — B95.0 STREPTOCOCCUS, GROUP A, AS THE CAUSE OF DISEASES CLASSIFIED ELSEWHERE: ICD-10-CM

## 2018-06-14 DIAGNOSIS — B95.61 METHICILLIN SUSCEPTIBLE STAPHYLOCOCCUS AUREUS INFECTION AS THE CAUSE OF DISEASES CLASSIFIED ELSEWHERE: ICD-10-CM

## 2018-06-14 DIAGNOSIS — L98.499 NON-PRESSURE CHRONIC ULCER OF SKIN OF OTHER SITES WITH UNSPECIFIED SEVERITY: ICD-10-CM

## 2018-06-14 DIAGNOSIS — M54.9 DORSALGIA, UNSPECIFIED: ICD-10-CM

## 2018-06-24 ENCOUNTER — INPATIENT (INPATIENT)
Facility: HOSPITAL | Age: 38
LOS: 2 days | Discharge: LEFT BEFORE DISCHARGE | End: 2018-06-27
Attending: INTERNAL MEDICINE | Admitting: INTERNAL MEDICINE

## 2018-06-24 VITALS
HEIGHT: 73 IN | HEART RATE: 72 BPM | DIASTOLIC BLOOD PRESSURE: 77 MMHG | WEIGHT: 169.98 LBS | RESPIRATION RATE: 20 BRPM | TEMPERATURE: 98 F | SYSTOLIC BLOOD PRESSURE: 142 MMHG

## 2018-06-24 DIAGNOSIS — F10.10 ALCOHOL ABUSE, UNCOMPLICATED: ICD-10-CM

## 2018-06-24 DIAGNOSIS — F19.10 OTHER PSYCHOACTIVE SUBSTANCE ABUSE, UNCOMPLICATED: ICD-10-CM

## 2018-06-24 PROBLEM — M54.9 DORSALGIA, UNSPECIFIED: Chronic | Status: ACTIVE | Noted: 2018-06-09

## 2018-06-24 LAB
ALBUMIN SERPL ELPH-MCNC: 3.6 G/DL — SIGNIFICANT CHANGE UP (ref 3.5–5.2)
ALBUMIN SERPL ELPH-MCNC: 3.7 G/DL — SIGNIFICANT CHANGE UP (ref 3.5–5.2)
ALP SERPL-CCNC: 256 U/L — HIGH (ref 30–115)
ALP SERPL-CCNC: 331 U/L — HIGH (ref 30–115)
ALT FLD-CCNC: 150 U/L — HIGH (ref 0–41)
ALT FLD-CCNC: 298 U/L — HIGH (ref 0–41)
ANION GAP SERPL CALC-SCNC: 12 MMOL/L — SIGNIFICANT CHANGE UP (ref 7–14)
ANION GAP SERPL CALC-SCNC: 9 MMOL/L — SIGNIFICANT CHANGE UP (ref 7–14)
APAP SERPL-MCNC: <5 UG/ML — LOW (ref 10–30)
APPEARANCE UR: (no result)
APTT BLD: 32.7 SEC — SIGNIFICANT CHANGE UP (ref 27–39.2)
AST SERPL-CCNC: 166 U/L — HIGH (ref 0–41)
AST SERPL-CCNC: 458 U/L — HIGH (ref 0–41)
BASOPHILS # BLD AUTO: 0.11 K/UL — SIGNIFICANT CHANGE UP (ref 0–0.2)
BASOPHILS NFR BLD AUTO: 1 % — SIGNIFICANT CHANGE UP (ref 0–1)
BILIRUB DIRECT SERPL-MCNC: <0.2 MG/DL — SIGNIFICANT CHANGE UP (ref 0–0.2)
BILIRUB INDIRECT FLD-MCNC: >0.1 MG/DL — LOW (ref 0.2–1.2)
BILIRUB SERPL-MCNC: 0.3 MG/DL — SIGNIFICANT CHANGE UP (ref 0.2–1.2)
BILIRUB SERPL-MCNC: 0.4 MG/DL — SIGNIFICANT CHANGE UP (ref 0.2–1.2)
BILIRUB UR-MCNC: (no result)
BUN SERPL-MCNC: 12 MG/DL — SIGNIFICANT CHANGE UP (ref 10–20)
BUN SERPL-MCNC: 18 MG/DL — SIGNIFICANT CHANGE UP (ref 10–20)
CALCIUM SERPL-MCNC: 8.9 MG/DL — SIGNIFICANT CHANGE UP (ref 8.5–10.1)
CALCIUM SERPL-MCNC: 9.1 MG/DL — SIGNIFICANT CHANGE UP (ref 8.5–10.1)
CHLORIDE SERPL-SCNC: 103 MMOL/L — SIGNIFICANT CHANGE UP (ref 98–110)
CHLORIDE SERPL-SCNC: 98 MMOL/L — SIGNIFICANT CHANGE UP (ref 98–110)
CO2 SERPL-SCNC: 31 MMOL/L — SIGNIFICANT CHANGE UP (ref 17–32)
CO2 SERPL-SCNC: 31 MMOL/L — SIGNIFICANT CHANGE UP (ref 17–32)
COLOR SPEC: SIGNIFICANT CHANGE UP
CREAT SERPL-MCNC: 1 MG/DL — SIGNIFICANT CHANGE UP (ref 0.7–1.5)
CREAT SERPL-MCNC: 1.2 MG/DL — SIGNIFICANT CHANGE UP (ref 0.7–1.5)
DIFF PNL FLD: NEGATIVE — SIGNIFICANT CHANGE UP
EOSINOPHIL # BLD AUTO: 0.54 K/UL — SIGNIFICANT CHANGE UP (ref 0–0.7)
EOSINOPHIL NFR BLD AUTO: 4.7 % — SIGNIFICANT CHANGE UP (ref 0–8)
ETHANOL SERPL-MCNC: <10 MG/DL — HIGH
GLUCOSE SERPL-MCNC: 100 MG/DL — HIGH (ref 70–99)
GLUCOSE SERPL-MCNC: 111 MG/DL — HIGH (ref 70–99)
GLUCOSE UR QL: NEGATIVE — SIGNIFICANT CHANGE UP
HAV IGM SER-ACNC: SIGNIFICANT CHANGE UP
HBV CORE IGM SER-ACNC: SIGNIFICANT CHANGE UP
HBV SURFACE AG SER-ACNC: SIGNIFICANT CHANGE UP
HCT VFR BLD CALC: 37.3 % — LOW (ref 42–52)
HCV AB S/CO SERPL IA: 0.17 S/CO — SIGNIFICANT CHANGE UP
HCV AB SERPL-IMP: SIGNIFICANT CHANGE UP
HGB BLD-MCNC: 12.3 G/DL — LOW (ref 14–18)
IMM GRANULOCYTES NFR BLD AUTO: 0.3 % — SIGNIFICANT CHANGE UP (ref 0.1–0.3)
INR BLD: 1.11 RATIO — SIGNIFICANT CHANGE UP (ref 0.65–1.3)
KETONES UR-MCNC: 15 — SIGNIFICANT CHANGE UP
LEUKOCYTE ESTERASE UR-ACNC: NEGATIVE — SIGNIFICANT CHANGE UP
LYMPHOCYTES # BLD AUTO: 2.64 K/UL — SIGNIFICANT CHANGE UP (ref 1.2–3.4)
LYMPHOCYTES # BLD AUTO: 22.8 % — SIGNIFICANT CHANGE UP (ref 20.5–51.1)
MAGNESIUM SERPL-MCNC: 2.1 MG/DL — SIGNIFICANT CHANGE UP (ref 1.8–2.4)
MAGNESIUM SERPL-MCNC: 2.2 MG/DL — SIGNIFICANT CHANGE UP (ref 1.8–2.4)
MCHC RBC-ENTMCNC: 27.5 PG — SIGNIFICANT CHANGE UP (ref 27–31)
MCHC RBC-ENTMCNC: 33 G/DL — SIGNIFICANT CHANGE UP (ref 32–37)
MCV RBC AUTO: 83.3 FL — SIGNIFICANT CHANGE UP (ref 80–94)
MONOCYTES # BLD AUTO: 0.79 K/UL — HIGH (ref 0.1–0.6)
MONOCYTES NFR BLD AUTO: 6.8 % — SIGNIFICANT CHANGE UP (ref 1.7–9.3)
NEUTROPHILS # BLD AUTO: 7.45 K/UL — HIGH (ref 1.4–6.5)
NEUTROPHILS NFR BLD AUTO: 64.4 % — SIGNIFICANT CHANGE UP (ref 42.2–75.2)
NITRITE UR-MCNC: NEGATIVE — SIGNIFICANT CHANGE UP
NRBC # BLD: 0 /100 WBCS — SIGNIFICANT CHANGE UP (ref 0–0)
PH UR: 5.5 — SIGNIFICANT CHANGE UP (ref 5–8)
PHOSPHATE SERPL-MCNC: 3.3 MG/DL — SIGNIFICANT CHANGE UP (ref 2.1–4.9)
PLATELET # BLD AUTO: 356 K/UL — SIGNIFICANT CHANGE UP (ref 130–400)
POTASSIUM SERPL-MCNC: 4 MMOL/L — SIGNIFICANT CHANGE UP (ref 3.5–5)
POTASSIUM SERPL-MCNC: 4.9 MMOL/L — SIGNIFICANT CHANGE UP (ref 3.5–5)
POTASSIUM SERPL-SCNC: 4 MMOL/L — SIGNIFICANT CHANGE UP (ref 3.5–5)
POTASSIUM SERPL-SCNC: 4.9 MMOL/L — SIGNIFICANT CHANGE UP (ref 3.5–5)
PROT SERPL-MCNC: 7.4 G/DL — SIGNIFICANT CHANGE UP (ref 6–8)
PROT SERPL-MCNC: 7.5 G/DL — SIGNIFICANT CHANGE UP (ref 6–8)
PROT UR-MCNC: 100
PROTHROM AB SERPL-ACNC: 12 SEC — SIGNIFICANT CHANGE UP (ref 9.95–12.87)
RBC # BLD: 4.48 M/UL — LOW (ref 4.7–6.1)
RBC # FLD: 13.7 % — SIGNIFICANT CHANGE UP (ref 11.5–14.5)
SALICYLATES SERPL-MCNC: <0.3 MG/DL — LOW (ref 4–30)
SODIUM SERPL-SCNC: 141 MMOL/L — SIGNIFICANT CHANGE UP (ref 135–146)
SODIUM SERPL-SCNC: 143 MMOL/L — SIGNIFICANT CHANGE UP (ref 135–146)
SP GR SPEC: >=1.03 (ref 1.01–1.03)
UROBILINOGEN FLD QL: 1 (ref 0.2–0.2)
WBC # BLD: 11.57 K/UL — HIGH (ref 4.8–10.8)
WBC # FLD AUTO: 11.57 K/UL — HIGH (ref 4.8–10.8)

## 2018-06-24 RX ORDER — HYDROXYZINE HCL 10 MG
100 TABLET ORAL AT BEDTIME
Qty: 0 | Refills: 0 | Status: DISCONTINUED | OUTPATIENT
Start: 2018-06-24 | End: 2018-06-27

## 2018-06-24 RX ORDER — METHADONE HYDROCHLORIDE 40 MG/1
15 TABLET ORAL AT BEDTIME
Qty: 0 | Refills: 0 | Status: DISCONTINUED | OUTPATIENT
Start: 2018-06-24 | End: 2018-06-27

## 2018-06-24 RX ORDER — TUBERCULIN PURIFIED PROTEIN DERIVATIVE 5 [IU]/.1ML
5 INJECTION, SOLUTION INTRADERMAL ONCE
Qty: 0 | Refills: 0 | Status: COMPLETED | OUTPATIENT
Start: 2018-06-24 | End: 2018-06-24

## 2018-06-24 RX ORDER — METHADONE HYDROCHLORIDE 40 MG/1
10 TABLET ORAL ONCE
Qty: 0 | Refills: 0 | Status: DISCONTINUED | OUTPATIENT
Start: 2018-06-24 | End: 2018-06-24

## 2018-06-24 RX ORDER — MAGNESIUM HYDROXIDE 400 MG/1
30 TABLET, CHEWABLE ORAL DAILY
Qty: 0 | Refills: 0 | Status: DISCONTINUED | OUTPATIENT
Start: 2018-06-24 | End: 2018-06-27

## 2018-06-24 RX ORDER — METHADONE HYDROCHLORIDE 40 MG/1
10 TABLET ORAL EVERY 12 HOURS
Qty: 0 | Refills: 0 | Status: DISCONTINUED | OUTPATIENT
Start: 2018-06-26 | End: 2018-06-26

## 2018-06-24 RX ORDER — METHADONE HYDROCHLORIDE 40 MG/1
5 TABLET ORAL ONCE
Qty: 0 | Refills: 0 | Status: DISCONTINUED | OUTPATIENT
Start: 2018-06-24 | End: 2018-06-24

## 2018-06-24 RX ORDER — THIAMINE MONONITRATE (VIT B1) 100 MG
100 TABLET ORAL DAILY
Qty: 0 | Refills: 0 | Status: DISCONTINUED | OUTPATIENT
Start: 2018-06-24 | End: 2018-06-27

## 2018-06-24 RX ORDER — METHADONE HYDROCHLORIDE 40 MG/1
TABLET ORAL
Qty: 0 | Refills: 0 | Status: DISCONTINUED | OUTPATIENT
Start: 2018-06-25 | End: 2018-06-26

## 2018-06-24 RX ORDER — IBUPROFEN 200 MG
400 TABLET ORAL EVERY 6 HOURS
Qty: 0 | Refills: 0 | Status: DISCONTINUED | OUTPATIENT
Start: 2018-06-24 | End: 2018-06-27

## 2018-06-24 RX ORDER — HYDROXYZINE HCL 10 MG
50 TABLET ORAL EVERY 6 HOURS
Qty: 0 | Refills: 0 | Status: DISCONTINUED | OUTPATIENT
Start: 2018-06-24 | End: 2018-06-27

## 2018-06-24 RX ORDER — OXYCODONE HYDROCHLORIDE 5 MG/1
0 TABLET ORAL
Qty: 0 | Refills: 0 | COMMUNITY

## 2018-06-24 RX ORDER — METHADONE HYDROCHLORIDE 40 MG/1
15 TABLET ORAL ONCE
Qty: 0 | Refills: 0 | Status: DISCONTINUED | OUTPATIENT
Start: 2018-06-24 | End: 2018-06-24

## 2018-06-24 RX ORDER — METHADONE HYDROCHLORIDE 40 MG/1
15 TABLET ORAL EVERY 12 HOURS
Qty: 0 | Refills: 0 | Status: DISCONTINUED | OUTPATIENT
Start: 2018-06-25 | End: 2018-06-25

## 2018-06-24 RX ADMIN — METHADONE HYDROCHLORIDE 5 MILLIGRAM(S): 40 TABLET ORAL at 09:28

## 2018-06-24 RX ADMIN — Medication 25 MILLIGRAM(S): at 18:02

## 2018-06-24 RX ADMIN — Medication 100 MILLIGRAM(S): at 09:28

## 2018-06-24 RX ADMIN — TUBERCULIN PURIFIED PROTEIN DERIVATIVE 5 UNIT(S): 5 INJECTION, SOLUTION INTRADERMAL at 09:55

## 2018-06-24 RX ADMIN — Medication 25 MILLIGRAM(S): at 20:33

## 2018-06-24 RX ADMIN — Medication 30 MILLILITER(S): at 15:56

## 2018-06-24 RX ADMIN — Medication 1 TABLET(S): at 09:28

## 2018-06-24 RX ADMIN — METHADONE HYDROCHLORIDE 15 MILLIGRAM(S): 40 TABLET ORAL at 20:33

## 2018-06-24 RX ADMIN — Medication 300 MILLIGRAM(S): at 18:02

## 2018-06-24 RX ADMIN — METHADONE HYDROCHLORIDE 10 MILLIGRAM(S): 40 TABLET ORAL at 07:00

## 2018-06-24 NOTE — H&P ADULT - PROBLEM SELECTOR PLAN 1
-pain mgmt  -librium ciwa  -catapr0.1mg po q6hrs prn for BP>140/90  -atarax 100mg po qhs prn for insomnia  -atarax 50mg po q6hrs prn for anxiety

## 2018-06-24 NOTE — H&P ADULT - ASSESSMENT
39 yo M with pmhx of IVDA with heroine, cocaine, and alcohol abuse admitted for polysubstance detox.

## 2018-06-24 NOTE — ED PROVIDER NOTE - NS ED ROS FT
Review of Systems:  	•	CONSTITUTIONAL - no fever, no diaphoresis, no chills  	•	SKIN - no rash  	•	HEMATOLOGIC - no bleeding, no bruising  	•	EYES - no eye pain, no blurry vision  	•	ENT - no congestion  	•	RESPIRATORY - no shortness of breath, no cough  	•	CARDIAC - no chest pain, no palpitations  	•	GI - no abd pain, no nausea, no vomiting, no diarrhea, no constipation  	•	GENITO-URINARY - nno dysuria; no hematuria, no increased urinary frequency  	•	MUSCULOSKELETAL - no joint paint, no swelling, no redness  	•	NEUROLOGIC - no weakness, no headache, no paresthesias, no LOC  	•	PSYCH - no anxiety, non suicidal, non homicidal, no hallucination, no depression

## 2018-06-24 NOTE — H&P ADULT - ATTENDING COMMENTS
Patient interviewed and examined.    Chart reviewed.    PA's H&P noted and modified, as appropriate.    Case discussed on team rounds    Following is my summary of the case.    Admitted for detox: from __X__ED, ___Intake, ____Med/Surg Floor    Alcohol__X__   Opioid_X____  Benzo___ Other_____    Substance amount, duration of use, last usage, and prior attempts at detox or rehabs, are outlined above in the H&P and discussed with patient.    Associated withdrawal symptoms presents.  Comorbid conditions noted. Chronic and Stable.    Past Medical Hx, Psych Hx, family Hx, Social Hx from H&P reviewed and NO changes.    Old medical record and medication Hx. Reviewed    Following items reviewed and addressed:  1. labs  2. EKG  3. Imaging from PACs module    Examination: no change from PA's exam.    Place on following protocol  _____Medically Managed  __X__Medically Supervised    Ciwa__X___Librium taper____Ativan taper___Methadone taper_X__ Phenobarb taper____ Suboxone Induction____MMTP____    Narcan Kit Offered    Psych Consult __X__N/A  ___Ordered    Physical Therapy  ___X N/A   ___  Ordered    Aftercare disposition to be addressed by counselors.    Estimated length of stay 3-5 days.

## 2018-06-24 NOTE — H&P ADULT - NSHPPHYSICALEXAM_GEN_ALL_CORE
Vital Signs Last 24 Hrs  T(C): 36.8 (24 Jun 2018 01:36), Max: 36.8 (24 Jun 2018 01:36)  T(F): 98.2 (24 Jun 2018 01:36), Max: 98.2 (24 Jun 2018 01:36)  HR: 72 (24 Jun 2018 01:36) (72 - 72)  BP: 142/77 (24 Jun 2018 01:36) (142/77 - 142/77)  BP(mean): --  RR: 20 (24 Jun 2018 01:36) (20 - 20)  SpO2: --    CONSTITUTIONAL: Well-developed; well-nourished; in no acute distress.  	SKIN: Skin exam is warm and dry, no acute rash.  	HEAD: Normocephalic; atraumatic.  	EYES: PERRL, EOM intact; conjunctiva and sclera clear.  	ENT: No nasal discharge; airway clear.   	NECK: Supple; non tender.  	CARD: S1, S2 normal; no murmurs, gallops, or rubs. Regular rate and rhythm.  	RESP: Clear to auscultation bilaterally. No wheezes, rales or rhonchi.  	ABD: Normal bowel sounds; soft; non-distended; non-tender.   	EXT: Normal ROM. No edema.  	LYMPH: No acute cervical adenopathy.  	NEURO: Alert, oriented. Grossly unremarkable. No focal deficits.  PSYCH: Cooperative, appropriate

## 2018-06-24 NOTE — ED PROVIDER NOTE - MEDICAL DECISION MAKING DETAILS
I personally evaluated the patient. I reviewed the Resident’s or Physician Assistant’s note (as assigned above), and agree with the findings and plan except as documented in my note.  Chart reviewed. Requesting detox from alcohol. +cocaine abuser. Denies uicidal/homicidal ideation. Exam unremarkable. Will order detox protocol and admit.

## 2018-06-24 NOTE — CHART NOTE - NSCHARTNOTEFT_GEN_A_CORE
PPD PLACED RFA 6/24/18 TO BE READ 48-72 HRS  EB,RRT PPD PLACED RFA 6/24/18 TO BE READ 48-72 HRS  EB,RRT      ppd read - negative rn aware

## 2018-06-24 NOTE — H&P ADULT - HISTORY OF PRESENT ILLNESS
37 yo M with pmhx of IVDA with heroine, cocaine, and alcohol abuse presenting for detox from alcohol and cocaine/heroine. Patient last drink was yesterday. No cp, sob, fever, chills, abdominal pain, nausea, vomiting, diarrhea, back pain, urinary symptoms, headache, dizziness, suicidal or homicidal ideations.    HIV:  HEP C:  PPD: 37 yo M with pmhx of IVDA with heroine, cocaine, and alcohol abuse presenting for detox from alcohol and cocaine/heroine.  No cp, sob, fever, chills, abdominal pain, nausea, vomiting, diarrhea, back pain, urinary symptoms, headache, dizziness, suicidal or homicidal ideations.  Alcohol: 2 pints of vodka/day x 6 years. Last drink was yesterday.  Heroin: 10 to 20 bags IV/day x3 years. Last heroin was yesterday morning.    HIV: (-) 1/2018  HEP C: (-) 1/2018  PPD:(-) 1/2018

## 2018-06-24 NOTE — H&P ADULT - NSHPLABSRESULTS_GEN_ALL_CORE
12.3   11.57 )-----------( 356      ( 24 Jun 2018 02:20 )             37.3     06-24    141  |  98  |  12  ----------------------------<  100<H>  4.0   |  31  |  1.2    Ca    9.1      24 Jun 2018 02:20  Mg     2.2     06-24    TPro  7.5  /  Alb  3.7  /  TBili  0.4  /  DBili  x   /  AST  166<H>  /  ALT  150<H>  /  AlkPhos  256<H>  06-24            PT/INR - ( 24 Jun 2018 02:20 )   PT: 12.00 sec;   INR: 1.11 ratio         PTT - ( 24 Jun 2018 02:20 )  PTT:32.7 sec      CAPILLARY BLOOD GLUCOSE

## 2018-06-24 NOTE — ED PROVIDER NOTE - OBJECTIVE STATEMENT
39 yo M with pmhx of IVDA with heroine, cocaine, and alcohol abuse presenting for detox from alcohol and cocaine/heroine. Patient last drink was yesterday. No cp, sob, fever, chills, abdominal pain, nausea, vomiting, diarrhea, back pain, urinary symptoms, headache, dizziness, suicidal or homicidal ideations, paresthesias, or weakness.

## 2018-06-25 LAB
AMPHET UR-MCNC: NEGATIVE — SIGNIFICANT CHANGE UP
BARBITURATES UR SCN-MCNC: NEGATIVE — SIGNIFICANT CHANGE UP
BENZODIAZ UR-MCNC: NEGATIVE — SIGNIFICANT CHANGE UP
COCAINE METAB.OTHER UR-MCNC: POSITIVE
DRUG SCREEN 1, URINE RESULT: SIGNIFICANT CHANGE UP
METHADONE UR-MCNC: NEGATIVE — SIGNIFICANT CHANGE UP
OPIATES UR-MCNC: POSITIVE
PCP UR-MCNC: NEGATIVE — SIGNIFICANT CHANGE UP
PROPOXYPHENE QUALITATIVE URINE RESULT: NEGATIVE — SIGNIFICANT CHANGE UP
T PALLIDUM AB TITR SER: NEGATIVE — SIGNIFICANT CHANGE UP
THC UR QL: NEGATIVE — SIGNIFICANT CHANGE UP

## 2018-06-25 RX ADMIN — METHADONE HYDROCHLORIDE 15 MILLIGRAM(S): 40 TABLET ORAL at 08:38

## 2018-06-25 RX ADMIN — METHADONE HYDROCHLORIDE 15 MILLIGRAM(S): 40 TABLET ORAL at 21:18

## 2018-06-25 RX ADMIN — Medication 1 TABLET(S): at 08:38

## 2018-06-25 RX ADMIN — Medication 100 MILLIGRAM(S): at 08:38

## 2018-06-25 NOTE — CHART NOTE - NSCHARTNOTEFT_GEN_A_CORE
Subsequent Inpatient Encounter                                       Detox Unit    TIMOTHY AYALA   38y   Male      Chief Complaint:    Follow up for Polysubstance  Dependency    HPI:     I reviewed previous notes. No Change, except if noted below.             Detail:_    ROS:   I reviewed with patient.  No changes from previous notes except if noted below.             Detail: _    PFSH I reviewed with patient. No changes from previous notes except if noted below.             Detail_    Medication reconciliation performed.    MEDICATIONS  (STANDING):  methadone    Tablet 15 milliGRAM(s) Oral every 12 hours  methadone    Tablet 15 milliGRAM(s) Oral at bedtime  multivitamin 1 Tablet(s) Oral daily  thiamine 100 milliGRAM(s) Oral daily      MEDICATIONS  (PRN):  aluminum hydroxide/magnesium hydroxide/simethicone Suspension 30 milliLiter(s) Oral every 4 hours PRN Dyspepsia  chlordiazePOXIDE 25 milliGRAM(s) Oral every 2 hours PRN Alcohol Withdrawal Symptoms  chlordiazePOXIDE 50 milliGRAM(s) Oral every 1 hour PRN Alcohol Withdrawal Symptoms  cloNIDine 0.1 milliGRAM(s) Oral every 6 hours PRN BP>140/90  hydrOXYzine hydrochloride 50 milliGRAM(s) Oral every 6 hours PRN Anxiety  hydrOXYzine hydrochloride 100 milliGRAM(s) Oral at bedtime PRN sleep  ibuprofen  Tablet 400 milliGRAM(s) Oral every 6 hours PRN pain  magnesium hydroxide Suspension 30 milliLiter(s) Oral daily PRN Constipation  trimethobenzamide 300 milliGRAM(s) Oral every 6 hours PRN Nausea and/or Vomiting      T(C): 35.6 (06-25-18 @ 06:07), Max: 36.8 (06-24-18 @ 20:04)  HR: 54 (06-25-18 @ 06:07) (51 - 92)  BP: 167/70 (06-25-18 @ 06:07) (125/63 - 167/70)  RR: 16 (06-25-18 @ 06:07) (16 - 16)  SpO2: --    PHYSICAL EXAM:      Constitutional: NAD, A&O x3    Eyes: PERRLA, no conjuctivitis    Neck: no lymphadenopathy    Respiratory: +air entry, no rales, no rhonchi, no wheezes    Cardiovascular: +S1 and S2, regular rate and rhythm    Gastrointestinal: +BS, soft, non-tender, not distended    Extremities:  no edema, no calf tenderness    Skin: no rashes, normal turgor                            12.3   11.57 )-----------( 356      ( 24 Jun 2018 02:20 )             37.3   06-24    143  |  103  |  18  ----------------------------<  111<H>  4.9   |  31  |  1.0    Ca    8.9      24 Jun 2018 11:54  Phos  3.3     06-24  Mg     2.1     06-24    TPro  7.4  /  Alb  3.6  /  TBili  0.3  /  DBili  <0.2  /  AST  458<H>  /  ALT  298<H>  /  AlkPhos  331<H>  06-24  PT/INR - ( 24 Jun 2018 02:20 )   PT: 12.00 sec;   INR: 1.11 ratio         PTT - ( 24 Jun 2018 02:20 )  PTT:32.7 sec  Magnesium, Serum: 2.1 mg/dL (06-24-18 @ 11:54)  Magnesium, Serum: 2.2 mg/dL (06-24-18 @ 02:20)  Treponema Pallidum Antibody Interpretation: Negative (06-24-18 @ 02:20)  Hepatitis B Surface Antigen: Nonreact (06-24-18 @ 02:20)  Hepatitis C Virus S/CO Ratio: 0.17 S/CO (06-24-18 @ 02:20)    Hepatitis C Virus Interpretation: Nonreact (06-24-18 @ 02:20)      Urinalysis Basic - ( 24 Jun 2018 01:55 )    Color: Johanne / Appearance: Slightly Cloudy / SG: >=1.030 / pH: x  Gluc: x / Ketone: 15  / Bili: Small / Urobili: 1.0   Blood: x / Protein: 100 / Nitrite: Negative   Leuk Esterase: Negative / RBC: 2-5 /HPF / WBC Negative   Sq Epi: x / Non Sq Epi: Occasional /HPF / Bacteria: Few    Drug Screen 1, Urine Result: Done (06-24-18 @ 01:55)        Impression and Plan:    Primary Diagnosis:  Etoh/Opiate Dependency                                Medication: librium ciwa/Methadone Protocol    Secondary Diagnosis:                                                                                Medication:    Tertiary Diagnosis:                                                                                     Medication:      Continue Detox Protocols. Use of PRNS as needed for withdrawal and comfort.    Adjustments to protocols:    Labs/ Tests reviewed.    Tests ordered:     Likely Disposition: _X__Home       ___Rehab       ___Outpatient Program    ___Self Help     _____Other    Estimated Length of stay:_5___

## 2018-06-26 VITALS
HEART RATE: 56 BPM | DIASTOLIC BLOOD PRESSURE: 60 MMHG | TEMPERATURE: 97 F | RESPIRATION RATE: 16 BRPM | SYSTOLIC BLOOD PRESSURE: 120 MMHG

## 2018-06-26 RX ORDER — METHADONE HYDROCHLORIDE 40 MG/1
TABLET ORAL
Qty: 0 | Refills: 0 | Status: DISCONTINUED | OUTPATIENT
Start: 2018-06-26 | End: 2018-06-27

## 2018-06-26 RX ORDER — METHADONE HYDROCHLORIDE 40 MG/1
10 TABLET ORAL
Qty: 0 | Refills: 0 | Status: COMPLETED | OUTPATIENT
Start: 2018-06-26 | End: 2018-06-27

## 2018-06-26 RX ORDER — METHADONE HYDROCHLORIDE 40 MG/1
5 TABLET ORAL
Qty: 0 | Refills: 0 | Status: DISCONTINUED | OUTPATIENT
Start: 2018-06-27 | End: 2018-06-27

## 2018-06-26 RX ADMIN — METHADONE HYDROCHLORIDE 10 MILLIGRAM(S): 40 TABLET ORAL at 09:21

## 2018-06-26 RX ADMIN — Medication 100 MILLIGRAM(S): at 09:21

## 2018-06-26 RX ADMIN — Medication 1 TABLET(S): at 09:21

## 2018-06-27 NOTE — CHART NOTE - NSCHARTNOTEFT_GEN_A_CORE
The patient was admitted to the inpt detox unit CDU, for   ETOH____ Opioid_x__  Benzo____Polysubstance _____ Dependency.    Pt was admitted from ED_x___, Intake____, Med/surg Floor_______.    Details are present in the preceding History & Physical section and follow up chart notes.  patient was evaluated on daily detox team  rounds.  Withdrawal symptoms and signs were reviewed on a daily basis, and the protocols were adjusted accordingly.    Labs and imaging results were reviewed and discussed with the patient.    All questions from the patient were addressed.  The patient was seen by the Chemical dependency counselors, and different options for after care were discussed.  The patient attended groups, meetings and 1:1 sessions with the counselors.  Narcane Kit was offered and instructions given prior to discharge.    Psychiatry consultation reviewed______, N/A_x_____    Physical therapy evaluation reviewed_____, N/A_x___    Pt was given copies of labs and imaging reports, if applicable.    Prescriptions if needed, were sent through [employer] system to the pharmacy amnd are noted in the discharge instruction sheet.    After care was arranged by counselors and pt was discharged to:    Home___, Outpt. Program___, Rehab ___, Long term____ [facility] ____ IPP____ SNF____, AMA__x_, Admin Discharge____    Principal Diagnosis: Alcohol Dependency____ Opioid Dependency_x__ Benzo Dependency____ Polysubstance Dependency____

## 2018-07-02 DIAGNOSIS — F14.20 COCAINE DEPENDENCE, UNCOMPLICATED: ICD-10-CM

## 2018-07-02 DIAGNOSIS — G89.29 OTHER CHRONIC PAIN: ICD-10-CM

## 2018-07-02 DIAGNOSIS — F10.20 ALCOHOL DEPENDENCE, UNCOMPLICATED: ICD-10-CM

## 2018-07-02 DIAGNOSIS — F31.9 BIPOLAR DISORDER, UNSPECIFIED: ICD-10-CM

## 2018-07-02 DIAGNOSIS — M54.9 DORSALGIA, UNSPECIFIED: ICD-10-CM

## 2018-07-02 DIAGNOSIS — F17.210 NICOTINE DEPENDENCE, CIGARETTES, UNCOMPLICATED: ICD-10-CM

## 2018-07-02 DIAGNOSIS — F11.20 OPIOID DEPENDENCE, UNCOMPLICATED: ICD-10-CM

## 2018-07-13 ENCOUNTER — EMERGENCY (EMERGENCY)
Facility: HOSPITAL | Age: 38
LOS: 0 days | Discharge: HOME | End: 2018-07-13
Attending: EMERGENCY MEDICINE | Admitting: EMERGENCY MEDICINE

## 2018-07-13 ENCOUNTER — OUTPATIENT (OUTPATIENT)
Dept: OUTPATIENT SERVICES | Facility: HOSPITAL | Age: 38
LOS: 1 days | Discharge: HOME | End: 2018-07-13

## 2018-07-13 VITALS
WEIGHT: 141.98 LBS | OXYGEN SATURATION: 100 % | RESPIRATION RATE: 20 BRPM | HEART RATE: 54 BPM | SYSTOLIC BLOOD PRESSURE: 146 MMHG | DIASTOLIC BLOOD PRESSURE: 74 MMHG | TEMPERATURE: 97 F

## 2018-07-13 DIAGNOSIS — F14.10 COCAINE ABUSE, UNCOMPLICATED: ICD-10-CM

## 2018-07-13 DIAGNOSIS — Z79.891 LONG TERM (CURRENT) USE OF OPIATE ANALGESIC: ICD-10-CM

## 2018-07-13 DIAGNOSIS — F11.20 OPIOID DEPENDENCE, UNCOMPLICATED: ICD-10-CM

## 2018-07-13 DIAGNOSIS — F10.10 ALCOHOL ABUSE, UNCOMPLICATED: ICD-10-CM

## 2018-07-13 DIAGNOSIS — F17.200 NICOTINE DEPENDENCE, UNSPECIFIED, UNCOMPLICATED: ICD-10-CM

## 2018-07-13 NOTE — ED PROVIDER NOTE - MEDICAL DECISION MAKING DETAILS
38m requesting detox. Detox unit called an no bed availability. Pt advised regarding symptomatic/supportive care, importance of PMD & outpatient Detox f/u, and symptoms to prompt ED return.

## 2018-07-13 NOTE — ED PROVIDER NOTE - PHYSICAL EXAMINATION
Physical Exam  General: Awake, alert, NAD, WDWN, NCAT, no skull/facial tender, no step-offs, no raccoon/kirk, non-toxic appearing  Eyes: PERRL, EOMI, no icterus, lids and conjunctivae are normal  ENT: External inspection normal, pink/moist membranes, pharynx normal  CV: S1S2, regular rate and rhythm, no murmur/gallops/rubs, no JVD, 2+ pulses b/l, no edema/cords/homans, warm/well-perfused  Respiratory: Normal respiratory rate/effort, no respiratory distress, normal voice, speaking full sentences, lungs clear to auscultation b/l, no wheezing/rales/rhonchi, no retractions, no stridor  Abdomen: Soft abdomen, no tender/distended/guarding/rebound, no CVA tender  Musculoskeletal: FROM all 4 extremities, N/V intact, pelvis stable, no TLS spinal tender/deform/step-offs, no niharika tender/deform, normal motor tone, stable gait  Neck: FROM neck, supple, no meningismus, trachea midline, no JVD, no cspine tender/step-offs  Integumentary: Color normal for race, warm and dry, no rash  Neuro: Oriented x3, CN 2-12 intact, normal motor, normal sensory, normal gait, normal cerebellar, no tremors, no clonus/rigidity/hyper-reflexia.  Psych: Oriented x3, mood normal, affect normal. No SI/HI, no AV hallucinations.

## 2018-07-13 NOTE — ED PROVIDER NOTE - OBJECTIVE STATEMENT
38m w a hx of polysubstance abuse & EtOH abuse presents requesting Detox eval. Pt admits to drinking vodka a few hours prior to arrival. Pt was recently admitted to detox. Pt denies any fall/trauma. Patient denies SI/HI, denies any self-harm attempt or OD, denies any other substance abuse/misuse, denies tremors, and denies AV hallucinations. Pt reports no other symptoms at this time.

## 2018-07-13 NOTE — ED PROVIDER NOTE - PLAN OF CARE
38m requesting detox. nontoxic appearing, n/v intact. no trauma. no evidence of withdrawal at this time. --Pt already tolerating PO intake in ED, will check detox bed availability

## 2018-07-13 NOTE — ED PROVIDER NOTE - CARE PLAN
Assessment and plan of treatment:	38m requesting detox. nontoxic appearing, n/v intact. no trauma. no evidence of withdrawal at this time. --Pt already tolerating PO intake in ED, will check detox bed availability

## 2018-07-13 NOTE — ED PROVIDER NOTE - NS ED ROS FT
Review of Systems  Constitutional:  No fever or chills.   Eyes:  Negative.   ENMT:  No nasal congestion, discharge, or throat pain.   Cardiac:  No chest pain, palpitations, syncope, or edema.  Respiratory:  No dyspnea, wheezing, or cough.   GI:  No vomiting, diarrhea, or abdominal pain. No melena or hematochezia.  :  No dysuria or hematuria.   Musculoskeletal:  No gait abnormality, joint swelling, joint pain, or back pain.  Skin:  No skin rash, jaundice, or lesions.  Neuro:  No headache, loss of sensation, or focal weakness. No tremors, no AV hallucinations.
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
## 2019-11-04 NOTE — ED ADULT NURSE NOTE - NS PRO PASSIVE SMOKE EXP
Physical Therapy Daily Treatment Note     Name: Benji Ring  Clinic Number: [id number]    Therapy Diagnosis:   No diagnosis found.  Physician: Ramiro Laws,*    Visit Date: 11/4/2019  Visit Date: 10/16/2019   Physician Orders: PT Eval and Treat   Medical Diagnosis from Referral: left cervical radiculopathy  Evaluation Date: 10/9/2019  Authorization Period Expiration: 12/31/19  Plan of Care Expiration: 12/6/19  Visit # / Visits authorized: 8/ 25     Time In: 4:45PM  Time Out: 5:25PM  Total Billable Time: 40 minutes    Precautions: Standard    Subjective     Pt reports:  She has had no cervical or left UE sx in last 3 days  She was compliant with home exercise program.  Response to previous treatment: centralization of sx  Functional change: sleeping better, looking down without radicular sx    Pain: 0/10  Location: left neck      Objective       Benji received therapeutic exercises to develop strength, ROM, flexibility and posture for 15 minutes including:  UBE 2 min retro-not performed  Sitting cervical retraction 5x 5sec  Scapular retraction x10  Sitting cervical extension x10  Supine retraction 20x  Supine ext 20x  Supine rotation 20x B  Seated L upper trap stretch 30 sec x 2   Seated L levator scap stretch 30 sec x 2     Benji received the following manual therapy techniques: Myofacial release and Soft tissue Mobilization were applied to the: left cervical musculature for 10 minutes, including:  STM B UT stretch, B lev scap stretch    Benji received the following supervised modalities after being cleared for contradictions: Mechanical Traction:  Benji received intermittent mechanical traction to the cervical spine at a force of 17 pounds max and 8 pounds min for a total of 15 minutes. Hold time of 30 seconds and rest time for 10 seconds. Patient tolerated treatment well without any adverse effects.     Home Exercises Provided and Patient Education Provided     Education provided:   - effects  of therapy  - centralization of symptoms     Written Home Exercises Provided: Patient instructed to cont prior HEP.  Exercises were reviewed and Benji was able to demonstrate them prior to the end of the session.  Benji demonstrated good  understanding of the education provided.     See EMR under Patient Instructions for exercises provided 10/09/19.    Assessment     No sx with cervical mobility testing today. Negative Spurling's test today.    Benji is progressing well towards her goals.   Pt prognosis is Good.     Pt will continue to benefit from skilled outpatient physical therapy to address the deficits listed in the problem list box on initial evaluation, provide pt/family education and to maximize pt's level of independence in the home and community environment.     Pt's spiritual, cultural and educational needs considered and pt agreeable to plan of care and goals.    Anticipated barriers to physical therapy: none    Goals:   Short Term Goals: 4 weeks   Pt will have centralization of sx not radiating past upper arm and less than 2/10. MET 10/23/19  Pt will be able to flex and rotate cervical spine to right without increase of left UE sx. MET 10/23/19  Pt will be able to sleep through night awakening with decreased pain. MET 10/23/19     Long Term Goals: 8 weeks   Pt will have centralization of sx to neck only with pain less than 2/10. PROGRESSING  Pt will be independent with HEP for sx management. PROGRESSING    Plan     Continue current POC with emphasis on resolving LUE radicular symptoms and decreasing pain.     Tara Woody, PT   Unknown

## 2019-11-19 ENCOUNTER — INPATIENT (INPATIENT)
Facility: HOSPITAL | Age: 39
LOS: 5 days | Discharge: HOME | End: 2019-11-25
Attending: PSYCHIATRY & NEUROLOGY | Admitting: PSYCHIATRY & NEUROLOGY
Payer: MEDICAID

## 2019-11-19 VITALS
DIASTOLIC BLOOD PRESSURE: 74 MMHG | HEART RATE: 78 BPM | RESPIRATION RATE: 20 BRPM | HEIGHT: 73 IN | TEMPERATURE: 98 F | SYSTOLIC BLOOD PRESSURE: 137 MMHG | OXYGEN SATURATION: 96 % | WEIGHT: 279.99 LBS

## 2019-11-19 DIAGNOSIS — F11.21 OPIOID DEPENDENCE, IN REMISSION: ICD-10-CM

## 2019-11-19 DIAGNOSIS — F39 UNSPECIFIED MOOD [AFFECTIVE] DISORDER: ICD-10-CM

## 2019-11-19 PROBLEM — F10.10 ALCOHOL ABUSE, UNCOMPLICATED: Chronic | Status: ACTIVE | Noted: 2018-07-13

## 2019-11-19 PROBLEM — F14.10 COCAINE ABUSE, UNCOMPLICATED: Chronic | Status: ACTIVE | Noted: 2018-07-13

## 2019-11-19 LAB
ALBUMIN SERPL ELPH-MCNC: 4.3 G/DL — SIGNIFICANT CHANGE UP (ref 3.5–5.2)
ALP SERPL-CCNC: 50 U/L — SIGNIFICANT CHANGE UP (ref 30–115)
ALT FLD-CCNC: 18 U/L — SIGNIFICANT CHANGE UP (ref 0–41)
ANION GAP SERPL CALC-SCNC: 12 MMOL/L — SIGNIFICANT CHANGE UP (ref 7–14)
APAP SERPL-MCNC: <5 UG/ML — LOW (ref 10–30)
AST SERPL-CCNC: 19 U/L — SIGNIFICANT CHANGE UP (ref 0–41)
BASOPHILS # BLD AUTO: 0.07 K/UL — SIGNIFICANT CHANGE UP (ref 0–0.2)
BASOPHILS NFR BLD AUTO: 0.5 % — SIGNIFICANT CHANGE UP (ref 0–1)
BILIRUB SERPL-MCNC: 0.3 MG/DL — SIGNIFICANT CHANGE UP (ref 0.2–1.2)
BUN SERPL-MCNC: 12 MG/DL — SIGNIFICANT CHANGE UP (ref 10–20)
CALCIUM SERPL-MCNC: 9.8 MG/DL — SIGNIFICANT CHANGE UP (ref 8.5–10.1)
CHLORIDE SERPL-SCNC: 104 MMOL/L — SIGNIFICANT CHANGE UP (ref 98–110)
CO2 SERPL-SCNC: 24 MMOL/L — SIGNIFICANT CHANGE UP (ref 17–32)
CREAT SERPL-MCNC: 1 MG/DL — SIGNIFICANT CHANGE UP (ref 0.7–1.5)
EOSINOPHIL # BLD AUTO: 0.23 K/UL — SIGNIFICANT CHANGE UP (ref 0–0.7)
EOSINOPHIL NFR BLD AUTO: 1.7 % — SIGNIFICANT CHANGE UP (ref 0–8)
ETHANOL SERPL-MCNC: <10 MG/DL — SIGNIFICANT CHANGE UP
GLUCOSE SERPL-MCNC: 138 MG/DL — HIGH (ref 70–99)
HCT VFR BLD CALC: 47.1 % — SIGNIFICANT CHANGE UP (ref 42–52)
HGB BLD-MCNC: 16 G/DL — SIGNIFICANT CHANGE UP (ref 14–18)
IMM GRANULOCYTES NFR BLD AUTO: 0.4 % — HIGH (ref 0.1–0.3)
LYMPHOCYTES # BLD AUTO: 2.99 K/UL — SIGNIFICANT CHANGE UP (ref 1.2–3.4)
LYMPHOCYTES # BLD AUTO: 22.6 % — SIGNIFICANT CHANGE UP (ref 20.5–51.1)
MAGNESIUM SERPL-MCNC: 1.8 MG/DL — SIGNIFICANT CHANGE UP (ref 1.8–2.4)
MCHC RBC-ENTMCNC: 29.6 PG — SIGNIFICANT CHANGE UP (ref 27–31)
MCHC RBC-ENTMCNC: 34 G/DL — SIGNIFICANT CHANGE UP (ref 32–37)
MCV RBC AUTO: 87.2 FL — SIGNIFICANT CHANGE UP (ref 80–94)
MONOCYTES # BLD AUTO: 0.88 K/UL — HIGH (ref 0.1–0.6)
MONOCYTES NFR BLD AUTO: 6.7 % — SIGNIFICANT CHANGE UP (ref 1.7–9.3)
NEUTROPHILS # BLD AUTO: 9.01 K/UL — HIGH (ref 1.4–6.5)
NEUTROPHILS NFR BLD AUTO: 68.1 % — SIGNIFICANT CHANGE UP (ref 42.2–75.2)
NRBC # BLD: 0 /100 WBCS — SIGNIFICANT CHANGE UP (ref 0–0)
PLATELET # BLD AUTO: 272 K/UL — SIGNIFICANT CHANGE UP (ref 130–400)
POTASSIUM SERPL-MCNC: 4 MMOL/L — SIGNIFICANT CHANGE UP (ref 3.5–5)
POTASSIUM SERPL-SCNC: 4 MMOL/L — SIGNIFICANT CHANGE UP (ref 3.5–5)
PROT SERPL-MCNC: 7 G/DL — SIGNIFICANT CHANGE UP (ref 6–8)
RBC # BLD: 5.4 M/UL — SIGNIFICANT CHANGE UP (ref 4.7–6.1)
RBC # FLD: 12.1 % — SIGNIFICANT CHANGE UP (ref 11.5–14.5)
SALICYLATES SERPL-MCNC: <0.3 MG/DL — LOW (ref 4–30)
SODIUM SERPL-SCNC: 140 MMOL/L — SIGNIFICANT CHANGE UP (ref 135–146)
WBC # BLD: 13.23 K/UL — HIGH (ref 4.8–10.8)
WBC # FLD AUTO: 13.23 K/UL — HIGH (ref 4.8–10.8)

## 2019-11-19 PROCEDURE — 90792 PSYCH DIAG EVAL W/MED SRVCS: CPT

## 2019-11-19 PROCEDURE — 99285 EMERGENCY DEPT VISIT HI MDM: CPT

## 2019-11-19 RX ORDER — HYDROXYZINE HCL 10 MG
50 TABLET ORAL EVERY 6 HOURS
Refills: 0 | Status: DISCONTINUED | OUTPATIENT
Start: 2019-11-20 | End: 2019-11-25

## 2019-11-19 RX ORDER — TRAZODONE HCL 50 MG
50 TABLET ORAL ONCE
Refills: 0 | Status: COMPLETED | OUTPATIENT
Start: 2019-11-19 | End: 2019-11-19

## 2019-11-19 RX ORDER — TRAZODONE HCL 50 MG
50 TABLET ORAL AT BEDTIME
Refills: 0 | Status: DISCONTINUED | OUTPATIENT
Start: 2019-11-20 | End: 2019-11-25

## 2019-11-19 RX ORDER — HALOPERIDOL DECANOATE 100 MG/ML
5 INJECTION INTRAMUSCULAR EVERY 6 HOURS
Refills: 0 | Status: DISCONTINUED | OUTPATIENT
Start: 2019-11-20 | End: 2019-11-25

## 2019-11-19 RX ORDER — ARIPIPRAZOLE 15 MG/1
5 TABLET ORAL DAILY
Refills: 0 | Status: DISCONTINUED | OUTPATIENT
Start: 2019-11-20 | End: 2019-11-25

## 2019-11-19 RX ORDER — DIPHENHYDRAMINE HCL 50 MG
50 CAPSULE ORAL EVERY 6 HOURS
Refills: 0 | Status: DISCONTINUED | OUTPATIENT
Start: 2019-11-20 | End: 2019-11-25

## 2019-11-19 RX ORDER — ARIPIPRAZOLE 15 MG/1
5 TABLET ORAL DAILY
Refills: 0 | Status: DISCONTINUED | OUTPATIENT
Start: 2019-11-19 | End: 2019-11-22

## 2019-11-19 RX ADMIN — Medication 50 MILLIGRAM(S): at 21:39

## 2019-11-19 NOTE — ED PROVIDER NOTE - CLINICAL SUMMARY MEDICAL DECISION MAKING FREE TEXT BOX
Pt SI, depressed, seen by psych rec admission when bed available.  Rec Abilify 5 mg daily, Trazadone 50 mg qhs

## 2019-11-19 NOTE — ED BEHAVIORAL HEALTH ASSESSMENT NOTE - SUICIDE RISK FACTORS
Family History of psychiatric diagnoses requiring hospitalization/Hopelessness or despair/Recent onset of current/past psychiatric diagnosis/Alcohol/Substance abuse disorders/Current mood episode

## 2019-11-19 NOTE — ED BEHAVIORAL HEALTH ASSESSMENT NOTE - DESCRIPTION (FIRST USE, LAST USE, QUANTITY, FREQUENCY, DURATION)
patient reports that he used heroin fro 3 years for which he received methadone while he was in group home . he reports that he is currently in a program patient reports that he used heroin for 3 years from the age of 35 -38 years old.
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
## 2019-11-19 NOTE — ED BEHAVIORAL HEALTH ASSESSMENT NOTE - SUMMARY
Mr Macdonald is a 39 year old man with a reported history of Bipolar disorder who presented to the ED for the evaluation of worsening depression and suicidal ideations. Patient appears to be very depressed , anhedonic , amotivational , hopeless, helpless with unclear suicidal ideations. His diagnosis of Bipolar disorder is unclear at this time given that he reportedly received this diagnosis at the age of 14 with unclear circumstances. Bipolar depression can not completely be ruled out given the positive family history of Bipolar disorder in his brother. he does not seem to have acute symptoms of psychosis or robert. At this time, patient is considered a danger to himself and needs inpatient psychiatric hospitalization for medication management and symptom stabilization . patient was offered Abilify 5mg daily and trazodone 50mg at bedtime to target unstable mood and insomnia respectively. he was informed of the potential side effects of the medications and was agreeable to taking the medication. Patient asked writer to inform his  at his program Mr [facility] ( [phone]) that he has been admitted to the inpatient psychiatry hospital. Mr Macdonald is a 39 year old man with a reported history of Bipolar disorder who presented to the ED for the evaluation of worsening depression and suicidal ideations. Patient appears to be very depressed , anhedonic , amotivational , hopeless, helpless with unclear suicidal ideations. His diagnosis of Bipolar disorder is unclear at this time given that he reportedly received this diagnosis at the age of 14 with unclear circumstances. Bipolar depression can not completely be ruled out given the positive family history of Bipolar disorder in his brother. he does not seem to have acute symptoms of psychosis or robert. At this time, patient is considered a danger to himself and needs inpatient psychiatric hospitalization for medication management and symptom stabilization . patient was offered Abilify 5mg daily and trazodone 50mg at bedtime to target unstable mood and insomnia respectively. he was informed of the potential side effects of the medications and was agreeable to taking the medication. Patient asked writer to inform his  at his outpatient drug treatment program( [facility])  Mr [facility] ( [phone]) that he has been admitted to the inpatient psychiatry hospital.

## 2019-11-19 NOTE — ED BEHAVIORAL HEALTH ASSESSMENT NOTE - OTHER PAST PSYCHIATRIC HISTORY (INCLUDE DETAILS REGARDING ONSET, COURSE OF ILLNESS, INPATIENT/OUTPATIENT TREATMENT)
Patient reports one inpatient psychiatric hospitalization for about 3-4 weeks when he was 14 years old and reports a suicide attempt by overdose on heroin

## 2019-11-19 NOTE — ED PROVIDER NOTE - PROGRESS NOTE DETAILS
patient evaluted by psych. requesting trazodone 50 mg HS and abilify 5 mg AM. accepting care from Dr. Patterson. pt [facility] medically cleared waiting transport Patient comfortable, cooperative, no complaints

## 2019-11-19 NOTE — ED BEHAVIORAL HEALTH ASSESSMENT NOTE - SUICIDE PROTECTIVE FACTORS
Responsibility to family and others/Positive therapeutic relationships/Supportive social network of family or friends

## 2019-11-19 NOTE — ED BEHAVIORAL HEALTH ASSESSMENT NOTE - HPI (INCLUDE ILLNESS QUALITY, SEVERITY, DURATION, TIMING, CONTEXT, MODIFYING FACTORS, ASSOCIATED SIGNS AND SYMPTOMS)
Mr Macdonald is a 39 year old  man, resides with his father , mother and 4 children , works as a  with a reported history of Bipolar disorder who presented to the ED for the evaluation of worsening depression and suicidal ideations.   Patient reports that if his mother had not brought him to the ED today, he would have blown his brains out . When asked if he had a gun, patient smiled and then replied " No". He reports that he is very depressed , with inability to maintain sleep, poor appetite, feelings of hopelessness and helplessness, loss of motivation to go to work , loss of interest in doing things he normally would enjoy but denies current suicidal thoughts , intent or plan. Patient denies acute symptoms of psychosis or robert. he denies current use of illicit drugs or alcohol however reports that he has been clean from heroin use for the past 3 years.  he reports that he currently does not have a psychiatrist and is not on any psychotropic medication. he reports that he was diagnosed with Bipolar disorder at the age of 14 and was on lithium for 3 years . he reports that he never continued the medication because it did work to control his symptoms as he was always depressed. When asked if he remembered why he was diagnosed with bipolar disorder. he reports that he would have week long episodes of elevated and irritable moods and insomnia , and then several weeks of depression. Mr Macdonald is a 39 year old  man, resides with his father , mother and 4 children , works as a  with a reported history of Bipolar disorder who presented to the ED for the evaluation of worsening depression and suicidal ideations.   Patient reports that if his mother had not brought him to the ED today, he would have blown his brains out . When asked if he had a gun, patient smiled and then replied " No". he states all I know is one minute , I'm working , hanging around all my kids enjoying myself and a few days later, I don't want anyone around me. He reports that he is very depressed , with inability to maintain sleep, poor appetite, feelings of hopelessness and helplessness, loss of motivation to go to work , loss of interest in doing things he normally would enjoy but denies current suicidal thoughts , intent or plan. Patient denies acute symptoms of psychosis or robert. he denies current use of illicit drugs or alcohol however reports that he has been clean from heroin use for the past 3 years.  He reports that he currently does not have a psychiatrist and is not on any psychotropic medication. he reports that he was diagnosed with Bipolar disorder at the age of 14 and was on lithium for 3 years . he reports that he never continued the medication because it did work to control his symptoms as he was always depressed. When asked if he remembered why he was diagnosed with bipolar disorder. he reports that he would have week long episodes of elevated and irritable moods and insomnia , and then several weeks of depression.  Attempts to obtain collateral information from patient's mother Khadra ( [phone]) as writer could not get through to her. Mr Macdonald is a 39 year old  man, resides with his father , mother and 4 children , works as a  with a reported history of Bipolar disorder and opioid dependence in remission who presented to the ED for the evaluation of worsening depression and suicidal ideations.   Patient reports that if his mother had not brought him to the ED today, he would have blown his brains out . When asked if he had a gun, patient smiled and then replied " No". he states all I know is one minute , I'm working , hanging around all my kids enjoying myself and a few days later, I don't want anyone around me. He reports that he is very depressed , with inability to maintain sleep, poor appetite, feelings of hopelessness and helplessness, loss of motivation to go to work , loss of interest in doing things he normally would enjoy but denies current suicidal thoughts , intent or plan. Patient denies acute symptoms of psychosis or robert. he denies current use of illicit drugs or alcohol however reports that he has been clean from heroin use for the past 3 years.  He reports that he currently does not have a psychiatrist and is not on any psychotropic medication. he reports that he was diagnosed with Bipolar disorder at the age of 14 and was on lithium for 3 years . he reports that he never continued the medication because it did work to control his symptoms as he was always depressed. When asked if he remembered why he was diagnosed with bipolar disorder. he reports that he would have week long episodes of elevated and irritable moods and insomnia , and then several weeks of depression.  Attempts to obtain collateral information from patient's mother Khadra ( [phone]) as writer could not get through to her.

## 2019-11-19 NOTE — ED PROVIDER NOTE - OBJECTIVE STATEMENT
38 y/o male presents with suicidal thoughts. patient states thoughts worse over past week due to his father being sick. patient without any prior suicide attempts. patient hx of heroin abuse last usage 2 yrs ago. patient hx of bipolar disorder and was taking lithium in past. patient not taking any medications currently. patient c/o mind racing, lack of sleep. patient denies any cp, sob, abdominal pain, back pain.
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
## 2019-11-19 NOTE — ED BEHAVIORAL HEALTH ASSESSMENT NOTE - RISK ASSESSMENT
P Moderate Acute Suicide Risk Patient's risk factors for suicide are current mood symptoms , suicidal ideations , unclear past suicide attempt , history or opioid dependence however patient is motivated for treatment , is compliant with his drug treatment program, has good social support , is not actively using drugs

## 2019-11-19 NOTE — ED BEHAVIORAL HEALTH ASSESSMENT NOTE - TREATMENT
Patient reports that he was started on Methadone maintenance while he was in shelter, after which he was a court mandated inpatient drug treatment  program for 6 months and now is in court mandated drug treatment program called [facility]

## 2019-11-19 NOTE — ED PROVIDER NOTE - ATTENDING CONTRIBUTION TO CARE
40 yo M h/o depressions, states was on Lithium in past, currently not on any meds presents with c/o feeling depressed over last 1 week, cele since father is sick.  Pt has thoughts of killing himself.  Not sleeping well.  no CP, no SOB.  Pt h/o drug abuse in the past, has been clean for 2 yrs.  On exam pt in NAD AAO x 3, no signs of trauma, steady gait, Lungs CTA  B/L, no wrr, OP clear, PERRL,  no tremor, abd nt nd, soft

## 2019-11-19 NOTE — ED ADULT TRIAGE NOTE - CHIEF COMPLAINT QUOTE
pt states he was diagnosed with manic depressive disorder years ago and thinks he was incorrectly diagnosed, states he doesn't want to live anymore x 1 week, states he wants to throw himself in front of a bus

## 2019-11-20 LAB
AMPHET UR-MCNC: NEGATIVE — SIGNIFICANT CHANGE UP
APPEARANCE UR: CLEAR — SIGNIFICANT CHANGE UP
APPEARANCE UR: CLEAR — SIGNIFICANT CHANGE UP
BARBITURATES UR SCN-MCNC: NEGATIVE — SIGNIFICANT CHANGE UP
BENZODIAZ UR-MCNC: NEGATIVE — SIGNIFICANT CHANGE UP
BILIRUB UR-MCNC: NEGATIVE — SIGNIFICANT CHANGE UP
BILIRUB UR-MCNC: NEGATIVE — SIGNIFICANT CHANGE UP
COCAINE METAB.OTHER UR-MCNC: POSITIVE
COLOR SPEC: YELLOW — SIGNIFICANT CHANGE UP
COLOR SPEC: YELLOW — SIGNIFICANT CHANGE UP
DIFF PNL FLD: NEGATIVE — SIGNIFICANT CHANGE UP
DIFF PNL FLD: NEGATIVE — SIGNIFICANT CHANGE UP
GLUCOSE UR QL: NEGATIVE MG/DL — SIGNIFICANT CHANGE UP
GLUCOSE UR QL: NEGATIVE MG/DL — SIGNIFICANT CHANGE UP
KETONES UR-MCNC: NEGATIVE — SIGNIFICANT CHANGE UP
KETONES UR-MCNC: NEGATIVE — SIGNIFICANT CHANGE UP
LEUKOCYTE ESTERASE UR-ACNC: NEGATIVE — SIGNIFICANT CHANGE UP
LEUKOCYTE ESTERASE UR-ACNC: NEGATIVE — SIGNIFICANT CHANGE UP
METHADONE UR-MCNC: NEGATIVE — SIGNIFICANT CHANGE UP
NITRITE UR-MCNC: NEGATIVE — SIGNIFICANT CHANGE UP
NITRITE UR-MCNC: NEGATIVE — SIGNIFICANT CHANGE UP
OPIATES UR-MCNC: NEGATIVE — SIGNIFICANT CHANGE UP
PCP SPEC-MCNC: SIGNIFICANT CHANGE UP
PH UR: 6 — SIGNIFICANT CHANGE UP (ref 5–8)
PH UR: 7 — SIGNIFICANT CHANGE UP (ref 5–8)
PROPOXYPHENE QUALITATIVE URINE RESULT: NEGATIVE — SIGNIFICANT CHANGE UP
PROT UR-MCNC: NEGATIVE MG/DL — SIGNIFICANT CHANGE UP
PROT UR-MCNC: NEGATIVE MG/DL — SIGNIFICANT CHANGE UP
SP GR SPEC: 1.02 — SIGNIFICANT CHANGE UP (ref 1.01–1.03)
SP GR SPEC: >=1.03 (ref 1.01–1.03)
UROBILINOGEN FLD QL: 0.2 MG/DL — SIGNIFICANT CHANGE UP (ref 0.2–0.2)
UROBILINOGEN FLD QL: 0.2 MG/DL — SIGNIFICANT CHANGE UP (ref 0.2–0.2)

## 2019-11-20 RX ORDER — LANOLIN ALCOHOL/MO/W.PET/CERES
5 CREAM (GRAM) TOPICAL AT BEDTIME
Refills: 0 | Status: DISCONTINUED | OUTPATIENT
Start: 2019-11-20 | End: 2019-11-25

## 2019-11-20 RX ADMIN — Medication 5 MILLIGRAM(S): at 20:29

## 2019-11-20 RX ADMIN — ARIPIPRAZOLE 5 MILLIGRAM(S): 15 TABLET ORAL at 06:11

## 2019-11-20 RX ADMIN — ARIPIPRAZOLE 5 MILLIGRAM(S): 15 TABLET ORAL at 18:13

## 2019-11-20 RX ADMIN — Medication 50 MILLIGRAM(S): at 20:29

## 2019-11-20 NOTE — PROGRESS NOTE BEHAVIORAL HEALTH - SUICIDE PROTECTIVE FACTORS
Has future plans/Engaged in work or school/Responsibility to family and others/Identifies reasons for living

## 2019-11-20 NOTE — PROGRESS NOTE BEHAVIORAL HEALTH - NSBHFUPADDHPIFT_PSY_A_CORE
Pt is a 38 yo  male with four children (19, 18, 15, 13) who reports Heroin (IV) x 3 years, on remission x one year, off Methodone (10 months) since the beginning of 2019. He returned from Court mandated inpatient drug program to home since June 2019. He supposed to follow up at [facility] 2x/week for drug screen but has not yet complied. He is now on parole and pending to Court hearing.    Pt stated that he came in yesterday because he wants to get individual therapy because he cannot go to the program with many clients using substance. He said he does not want to relapse, or fails his family. He current works for a [employer].  He did not report his legal status. He denies SI or HI, saying that he has never thought about suicide as he thinks there are too much for hims stay alive, cele. for his children.

## 2019-11-20 NOTE — PROGRESS NOTE BEHAVIORAL HEALTH - NSBHFUPSTRENGTHS_PSY_A_CORE
Motivated and ready for change/Attempting to realize his/her potential/Has supportive interpersonal relationships with family, friends or peers/Able to manage surrounding demands/opportunities/Able to set and pursue goals

## 2019-11-20 NOTE — ED ADULT NURSE REASSESSMENT NOTE - NS ED NURSE REASSESS COMMENT FT1
received pt as ER Psych hold, calm, sleeping as of this time, maintained on 1:1 sit for safety, needs attended, monitored

## 2019-11-20 NOTE — CONSULT NOTE ADULT - SUBJECTIVE AND OBJECTIVE BOX
TIMOTHY AYALA  39y  Male      Patient is a 39y old  Male who presents with a chief complaint of       PAST MEDICAL/SURGICAL HISTORY  PAST MEDICAL & SURGICAL HISTORY:  Cocaine abuse  Alcohol abuse  Back pain  Intravenous drug abuse  No significant past surgical history      REVIEW OF SYSTEMS:  CONSTITUTIONAL: No fever, weight loss, or fatigue  EYES: No eye pain, visual disturbances, or discharge  ENMT:  No difficulty hearing, tinnitus, vertigo; No sinus or throat pain  NECK: No pain or stiffness  RESPIRATORY: No cough, wheezing, chills or hemoptysis; No shortness of breath  CARDIOVASCULAR: No chest pain, palpitations, dizziness, or leg swelling  GASTROINTESTINAL: No abdominal or epigastric pain. No nausea, vomiting, or hematemesis; No diarrhea or constipation. No melena or hematochezia.  GENITOURINARY: No dysuria, frequency, hematuria, or incontinence    ALLERY AND IMMUNOLOGIC: No hives or eczema    ARIPiprazole 5 milliGRAM(s) Oral daily  ARIPiprazole 5 milliGRAM(s) Oral daily  diphenhydrAMINE 50 milliGRAM(s) Oral every 6 hours PRN  haloperidol     Tablet 5 milliGRAM(s) Oral every 6 hours PRN  hydrOXYzine hydrochloride 50 milliGRAM(s) Oral every 6 hours PRN  LORazepam     Tablet 2 milliGRAM(s) Oral every 6 hours PRN  melatonin. 5 milliGRAM(s) Oral at bedtime  traZODone 50 milliGRAM(s) Oral at bedtime      T(C): 35.7 (11-20-19 @ 14:26), Max: 36.9 (11-20-19 @ 02:14)  HR: 69 (11-20-19 @ 14:26) (56 - 69)  BP: 144/86 (11-20-19 @ 14:26) (121/84 - 144/86)  RR: 16 (11-20-19 @ 11:15) (16 - 20)  SpO2: 96% (11-20-19 @ 11:15) (96% - 98%)  Wt(kg): --Vital Signs Last 24 Hrs  T(C): 35.7 (20 Nov 2019 14:26), Max: 36.9 (20 Nov 2019 02:14)  T(F): 96.3 (20 Nov 2019 14:26), Max: 98.5 (20 Nov 2019 02:14)  HR: 69 (20 Nov 2019 14:26) (56 - 69)  BP: 144/86 (20 Nov 2019 14:26) (121/84 - 144/86)  BP(mean): --  RR: 16 (20 Nov 2019 11:15) (16 - 20)  SpO2: 96% (20 Nov 2019 11:15) (96% - 98%)    PHYSICAL EXAM:  GENERAL: NAD, well-groomed, well-developed  HEAD:  Atraumatic, Normocephalic  EYES: EOMI, PERRLA, conjunctiva and sclera clear  ENMT: No tonsillar erythema, exudates, or enlargement; Moist mucous membranes, Good dentition, No lesions  NECK: Supple, No JVD, Normal thyroid  NERVOUS SYSTEM:  Alert & Oriented X3, Good concentration; Motor Strength 5/5 B/L upper and lower extremities; DTRs 2+ intact and symmetric  CHEST/LUNG: Clear to percussion bilaterally; No rales, rhonchi, wheezing, or rubs  HEART: Regular rate and rhythm; No murmurs, rubs, or gallops  ABDOMEN: Soft, Nontender, Nondistended; Bowel sounds present  EXTREMITIES:  2+ Peripheral Pulses, No clubbing, cyanosis, or edema  LYMPH: No lymphadenopathy noted  SKIN: No rashes or lesions      LABS:  CBC       BMP  11-19-19 @ 16:38  Anion Gap. Serum 12  Blood Urea Nitrogen,Serm 12  Calcium, Total Serum 9.8  Carbon Dioxide, Serum 24  Chloride, Serum 104  Creatinine, Serum 1.0  eGFR in  110  eGFR in Non Afican American 95  Gloucose, serum 138  Potassium, Serum 4.0  Sodium, Serum 140                  CMP  11-19-19 @ 16:38  Gabby Aminotransferase(ALT/SGPT)18  Albumin, Serum 4.3  Alkaline Phosphatase, Serum 50  Anion Gap, Serum 12  Aspartate Aminotransferase (AST/SGOT)19  Bilirubin Total, Serum 0.3  Blood Urea Nitrogen, Serum 12  Calcium,Total Serum 9.8  Carbon Dioxide, Serum 24  Chloride, Serum 104  Creatinine, Serum 1.0  eGFR if  110  eGFR if Non African American 95  Glucose, Serum 138  Potassium, Serum 4.0  Protein Total, Serum 7.0  Sodium, Serum 140                          PT/INR      Amylase/Lipase            RADIOLOGY & ADDITIONAL TESTS:    Imaging Personally Reviewed:  [ ] YES  [ ] NO

## 2019-11-20 NOTE — PROGRESS NOTE BEHAVIORAL HEALTH - NSBHADDHXPSYCHFT_PSY_A_CORE
Pt reported past hx of behavioral problems in his teen year, and using drugs etc and had 3-4 times being arrested.

## 2019-11-21 DIAGNOSIS — F31.9 BIPOLAR DISORDER, UNSPECIFIED: ICD-10-CM

## 2019-11-21 LAB
CHOLEST SERPL-MCNC: 186 MG/DL — SIGNIFICANT CHANGE UP (ref 100–200)
HDLC SERPL-MCNC: 31 MG/DL — LOW
LIPID PNL WITH DIRECT LDL SERPL: 121 MG/DL — SIGNIFICANT CHANGE UP (ref 4–129)
TOTAL CHOLESTEROL/HDL RATIO MEASUREMENT: 6 RATIO — HIGH (ref 4–5.5)
TRIGL SERPL-MCNC: 283 MG/DL — HIGH (ref 10–149)

## 2019-11-21 RX ADMIN — ARIPIPRAZOLE 5 MILLIGRAM(S): 15 TABLET ORAL at 08:08

## 2019-11-21 RX ADMIN — Medication 5 MILLIGRAM(S): at 20:36

## 2019-11-21 RX ADMIN — Medication 50 MILLIGRAM(S): at 20:36

## 2019-11-21 NOTE — PROGRESS NOTE BEHAVIORAL HEALTH - NSBHADMITCOORDWITH_PSY_A_CORE
pt does not want the undersigned contacting his family/medical staff/social work
medical staff/outpatient provider

## 2019-11-21 NOTE — PROGRESS NOTE BEHAVIORAL HEALTH - NSBHADMITCOUNSEL_PSY_A_CORE
risks and benefits of treatment options/instructions for management, treatment and follow up/diagnostic results/impressions and/or recommended studies/importance of adherence to chosen treatment
diagnostic results/impressions and/or recommended studies/instructions for management, treatment and follow up

## 2019-11-21 NOTE — CONSULT NOTE ADULT - SUBJECTIVE AND OBJECTIVE BOX
TIMOTHY AYALA  39y  Male      Patient is a 39y old  Male who presents with a chief complaint of   HPI:    INTERVAL HPI/OVERNIGHT EVENTS:  HEALTH ISSUES - PROBLEM Dx:  Opioid dependence in remission  Mood disorder        PAST MEDICAL & SURGICAL HISTORY:  Cocaine abuse  Alcohol abuse  Back pain  Intravenous drug abuse  No significant past surgical history    FAMILY HISTORY:  No pertinent family history in first degree relatives    ARIPiprazole 5 milliGRAM(s) Oral daily  ARIPiprazole 5 milliGRAM(s) Oral daily  diphenhydrAMINE 50 milliGRAM(s) Oral every 6 hours PRN  haloperidol     Tablet 5 milliGRAM(s) Oral every 6 hours PRN  hydrOXYzine hydrochloride 50 milliGRAM(s) Oral every 6 hours PRN  melatonin. 5 milliGRAM(s) Oral at bedtime  traZODone 50 milliGRAM(s) Oral at bedtime      REVIEW OF SYSTEMS:  CONSTITUTIONAL: No fever, weight loss, or fatigue  EYES: No eye pain, visual disturbances, or discharge  ENMT:  No difficulty hearing, tinnitus, vertigo; No sinus or throat pain  NECK: No pain or stiffness  BREASTS: No pain, masses, or nipple discharge  RESPIRATORY: No cough, wheezing, chills or hemoptysis; No shortness of breath  CARDIOVASCULAR: No chest pain, palpitations, dizziness, or leg swelling  GASTROINTESTINAL: No abdominal or epigastric pain. No nausea, vomiting, or hematemesis; No diarrhea or constipation. No melena or hematochezia.  GENITOURINARY: No dysuria, frequency, hematuria, or incontinence  NEUROLOGICAL: No headaches, memory loss, loss of strength, numbness, or tremors  SKIN: No itching, burning, rashes, or lesions   LYMPH NODES: No enlarged glands  ENDOCRINE: No heat or cold intolerance; No hair loss  MUSCULOSKELETAL: No joint pain or swelling; No muscle, back, or extremity pain  PSYCHIATRIC: as per hpi and previous psych history  HEME/LYMPH: No easy bruising, or bleeding gums  ALLERY AND IMMUNOLOGIC: No hives or eczema    T(C): 36.4 (19 @ 06:12), Max: 36.8 (19 @ 18:01)  HR: 71 (19 @ 06:12) (56 - 71)  BP: 164/94 (19 @ 06:12) (124/70 - 164/94)  RR: 18 (11-21-19 @ 06:12) (16 - 18)  SpO2: 96% (19 @ 11:15) (96% - 96%)  Wt(kg): --Vital Signs Last 24 Hrs  T(C): 36.4 (2019 06:12), Max: 36.8 (2019 18:01)  T(F): 97.6 (2019 06:12), Max: 98.3 (2019 18:01)  HR: 71 (2019 06:12) (56 - 71)  BP: 164/94 (2019 06:12) (124/70 - 164/94)  BP(mean): --  RR: 18 (2019 06:12) (16 - 18)  SpO2: 96% (2019 11:15) (96% - 96%)    PHYSICAL EXAM:  GENERAL: NAD,well-developed  HEAD:  Atraumatic, Normocephalic  EYES: EOMI, PERRLA, conjunctiva and sclera clear  ENMT: No tonsillar erythema, exudates, or enlargement; Moist mucous membranes, Good dentition, No lesions  NECK: Supple, No JVD, Normal thyroid  CHEST/LUNG: Clear bs bilaterally; No rales, rhonchi, wheezing  HEART: Regular rate and rhythm; No murmurs, rubs, or gallops  ABDOMEN: Soft, Nontender, Nondistended; Bowel sounds present  EXTREMITIES:  2+ Peripheral Pulses, No clubbing, cyanosis, or edema  LYMPH: No lymphadenopathy noted  SKIN: No rashes or lesions  Neuro: alert  no focal deficits    Consultant(s) Notes Reviewed:  [x ] YES  [ ] NO  Care Discussed with Consultants/Other Providers [ x] YES  [ ] NO    LABS:                        16.0   13.23 )-----------( 272      ( 2019 16:38 )             47.1         140  |  104  |  12  ----------------------------<  138<H>  4.0   |  24  |  1.0    Ca    9.8      2019 16:38  Mg     1.8         TPro  7.0  /  Alb  4.3  /  TBili  0.3  /  DBili  x   /  AST  19  /  ALT  18  /  AlkPhos  50  11-19      Urinalysis Basic - ( 2019 17:20 )    Color: Yellow / Appearance: Clear / S.025 / pH: x  Gluc: x / Ketone: Negative  / Bili: Negative / Urobili: 0.2 mg/dL   Blood: x / Protein: Negative mg/dL / Nitrite: Negative   Leuk Esterase: Negative / RBC: x / WBC x   Sq Epi: x / Non Sq Epi: x / Bacteria: x      CAPILLARY BLOOD GLUCOSE            Urinalysis Basic - ( 2019 17:20 )    Color: Yellow / Appearance: Clear / S.025 / pH: x  Gluc: x / Ketone: Negative  / Bili: Negative / Urobili: 0.2 mg/dL   Blood: x / Protein: Negative mg/dL / Nitrite: Negative   Leuk Esterase: Negative / RBC: x / WBC x   Sq Epi: x / Non Sq Epi: x / Bacteria: x        RADIOLOGY & ADDITIONAL TESTS:    Imaging Personally Reviewed:  [ ] YES  [ ] NO TIMOTHY AYALA  39y  Male      Patient is a 39y old  Male who presents with a chief complaint of   HPI:    INTERVAL HPI/OVERNIGHT EVENTS:  HEALTH ISSUES - PROBLEM Dx:  Opioid dependence in remission  Mood disorder        PAST MEDICAL & SURGICAL HISTORY:  Cocaine abuse  Alcohol abuse  Back pain  Intravenous drug abuse  No significant past surgical history    FAMILY HISTORY:  No pertinent family history in first degree relatives    ARIPiprazole 5 milliGRAM(s) Oral daily  ARIPiprazole 5 milliGRAM(s) Oral daily  diphenhydrAMINE 50 milliGRAM(s) Oral every 6 hours PRN  haloperidol     Tablet 5 milliGRAM(s) Oral every 6 hours PRN  hydrOXYzine hydrochloride 50 milliGRAM(s) Oral every 6 hours PRN  melatonin. 5 milliGRAM(s) Oral at bedtime  traZODone 50 milliGRAM(s) Oral at bedtime      REVIEW OF SYSTEMS:  CONSTITUTIONAL: No fever, weight loss, or fatigue  EYES: No eye pain, visual disturbances, or discharge  ENMT:  No difficulty hearing, tinnitus, vertigo; No sinus or throat pain  NECK: No pain or stiffness  BREASTS: No pain, masses, or nipple discharge  RESPIRATORY: No cough, wheezing, chills or hemoptysis; No shortness of breath  CARDIOVASCULAR: No chest pain, palpitations, dizziness, or leg swelling  GASTROINTESTINAL: No abdominal or epigastric pain. No nausea, vomiting, or hematemesis; No diarrhea or constipation. No melena or hematochezia.  GENITOURINARY: No dysuria, frequency, hematuria, or incontinence  NEUROLOGICAL: No headaches, memory loss, loss of strength, numbness, or tremors  SKIN: No itching, burning, rashes, or lesions   LYMPH NODES: No enlarged glands  ENDOCRINE: No heat or cold intolerance; No hair loss  MUSCULOSKELETAL: No joint pain or swelling; No muscle, back, or extremity pain  PSYCHIATRIC: as per hpi and previous psych history  HEME/LYMPH: No easy bruising, or bleeding gums  ALLERY AND IMMUNOLOGIC: No hives or eczema    T(C): 36.4 (19 @ 06:12), Max: 36.8 (19 @ 18:01)  HR: 71 (19 @ 06:12) (56 - 71)  BP: 164/94 (19 @ 06:12) (124/70 - 164/94)  RR: 18 (11-21-19 @ 06:12) (16 - 18)  SpO2: 96% (19 @ 11:15) (96% - 96%)  Wt(kg): --Vital Signs Last 24 Hrs  T(C): 36.4 (2019 06:12), Max: 36.8 (2019 18:01)  T(F): 97.6 (2019 06:12), Max: 98.3 (2019 18:01)  HR: 71 (2019 06:12) (56 - 71)  BP: 164/94 (2019 06:12) (124/70 - 164/94)  BP(mean): --  RR: 18 (2019 06:12) (16 - 18)  SpO2: 96% (2019 11:15) (96% - 96%)    PHYSICAL EXAM:  GENERAL: NAD,well-developed  HEAD:  Atraumatic, Normocephalic  EYES: EOMI, PERRLA, conjunctiva and sclera clear  ENMT: No tonsillar erythema, exudates, or enlargement; Moist mucous membranes, Good dentition, No lesions  NECK: Supple, No JVD, Normal thyroid  CHEST/LUNG: Clear bs bilaterally; No rales, rhonchi, wheezing  HEART: Regular rate and rhythm; No murmurs, rubs, or gallops  ABDOMEN: Soft, Nontender, Nondistended; Bowel sounds present  EXTREMITIES:  2+ Peripheral Pulses, No clubbing, cyanosis, or edema  LYMPH: No lymphadenopathy noted  SKIN: No rashes or lesions  tatoos all over body  Neuro: alert  no focal deficits    Consultant(s) Notes Reviewed:  [x ] YES  [ ] NO  Care Discussed with Consultants/Other Providers [ x] YES  [ ] NO    LABS:                        16.0   13.23 )-----------( 272      ( 2019 16:38 )             47.1         140  |  104  |  12  ----------------------------<  138<H>  4.0   |  24  |  1.0    Ca    9.8      2019 16:38  Mg     1.8         TPro  7.0  /  Alb  4.3  /  TBili  0.3  /  DBili  x   /  AST  19  /  ALT  18  /  AlkPhos  50        Urinalysis Basic - ( 2019 17:20 )    Color: Yellow / Appearance: Clear / S.025 / pH: x  Gluc: x / Ketone: Negative  / Bili: Negative / Urobili: 0.2 mg/dL   Blood: x / Protein: Negative mg/dL / Nitrite: Negative   Leuk Esterase: Negative / RBC: x / WBC x   Sq Epi: x / Non Sq Epi: x / Bacteria: x      CAPILLARY BLOOD GLUCOSE            Urinalysis Basic - ( 2019 17:20 )    Color: Yellow / Appearance: Clear / S.025 / pH: x  Gluc: x / Ketone: Negative  / Bili: Negative / Urobili: 0.2 mg/dL   Blood: x / Protein: Negative mg/dL / Nitrite: Negative   Leuk Esterase: Negative / RBC: x / WBC x   Sq Epi: x / Non Sq Epi: x / Bacteria: x        RADIOLOGY & ADDITIONAL TESTS:    Imaging Personally Reviewed:  [ ] YES  [ ] NO

## 2019-11-22 LAB
BENZOYLEGONINE, UR RESULT: SIGNIFICANT CHANGE UP NG/ML
BZE UR QL SCN: SIGNIFICANT CHANGE UP NG/ML
COCAINE IN-HOUSE INTERPRETATION: SIGNIFICANT CHANGE UP
COCAINE UR QL SCN: SIGNIFICANT CHANGE UP

## 2019-11-22 RX ADMIN — Medication 50 MILLIGRAM(S): at 20:48

## 2019-11-22 RX ADMIN — ARIPIPRAZOLE 5 MILLIGRAM(S): 15 TABLET ORAL at 08:23

## 2019-11-22 RX ADMIN — Medication 5 MILLIGRAM(S): at 20:48

## 2019-11-22 NOTE — PROGRESS NOTE ADULT - SUBJECTIVE AND OBJECTIVE BOX
pt stable alert in NAD  no new complaints    BIPOLAR 1 DISORDER  ^PSYCH EVAL/ DEPRESSED  No pertinent family history in first degree relatives  Handoff  MEWS Score  Cocaine abuse  Alcohol abuse  Back pain  Intravenous drug abuse  Bipolar 1 disorder  Bipolar 1 disorder  Opioid dependence in remission  Mood disorder  No significant past surgical history  PSYCH EVAL/ DEPRESSED  90+    HEALTH ISSUES - PROBLEM Dx:  Bipolar 1 disorder: Bipolar 1 disorder  Opioid dependence in remission  Mood disorder        PAST MEDICAL & SURGICAL HISTORY:  Cocaine abuse  Alcohol abuse  Back pain  Intravenous drug abuse  No significant past surgical history    No Known Allergies      FAMILY HISTORY:  No pertinent family history in first degree relatives      ARIPiprazole 5 milliGRAM(s) Oral daily  ARIPiprazole 5 milliGRAM(s) Oral daily  diphenhydrAMINE 50 milliGRAM(s) Oral every 6 hours PRN  haloperidol     Tablet 5 milliGRAM(s) Oral every 6 hours PRN  hydrOXYzine hydrochloride 50 milliGRAM(s) Oral every 6 hours PRN  melatonin. 5 milliGRAM(s) Oral at bedtime  traZODone 50 milliGRAM(s) Oral at bedtime      T(C): 36.6 (11-22-19 @ 08:24), Max: 36.6 (11-21-19 @ 16:01)  HR: 68 (11-22-19 @ 08:24) (51 - 68)  BP: 124/65 (11-22-19 @ 08:24) (124/65 - 125/57)  RR: 20 (11-22-19 @ 08:24) (16 - 20)  SpO2: --    PE;  general:  stable no acute changes    Lungs:    Heart:    EXT:    Neuro:  aelrt no deficits      15.9  47.5  10.90  --  --  --  --  --  --  --  13  1.2  4.4  143  16.0  47.1  13.23  12  1.0  4.0  138      11-21    139  |  99  |  13  ----------------------------<  143<H>  4.4   |  25  |  1.2    Ca    9.5      21 Nov 2019 08:44    TPro  7.0  /  Alb  4.3  /  TBili  0.4  /  DBili  x   /  AST  17  /  ALT  17  /  AlkPhos  50  11-21      LIVER FUNCTIONS - ( 21 Nov 2019 08:44 )  Alb: 4.3 g/dL / Pro: 7.0 g/dL / ALK PHOS: 50 U/L / ALT: 17 U/L / AST: 17 U/L / GGT: x                                   15.9   10.90 )-----------( 272      ( 21 Nov 2019 10:26 )             47.5       CAPILLARY BLOOD GLUCOSE

## 2019-11-23 PROCEDURE — 99231 SBSQ HOSP IP/OBS SF/LOW 25: CPT

## 2019-11-23 RX ADMIN — Medication 5 MILLIGRAM(S): at 20:17

## 2019-11-23 RX ADMIN — ARIPIPRAZOLE 5 MILLIGRAM(S): 15 TABLET ORAL at 08:38

## 2019-11-23 RX ADMIN — Medication 50 MILLIGRAM(S): at 20:17

## 2019-11-24 PROCEDURE — 99231 SBSQ HOSP IP/OBS SF/LOW 25: CPT

## 2019-11-24 RX ADMIN — Medication 5 MILLIGRAM(S): at 20:42

## 2019-11-24 RX ADMIN — Medication 50 MILLIGRAM(S): at 20:42

## 2019-11-24 RX ADMIN — ARIPIPRAZOLE 5 MILLIGRAM(S): 15 TABLET ORAL at 08:06

## 2019-11-24 NOTE — PROGRESS NOTE BEHAVIORAL HEALTH - NSBHFUPINTERVALHXFT_PSY_A_CORE
Chart reviewed , discussed with staff , pt seen. pt is complaint with medications , denies any side effects.  remains isolative. no acute event overnight . preoccupied with his discharge . denies s/h ideations intent or plan. behavior in control .
Chart reviewed. Nursing report received. Uneventful night. Pt urine tox scree positive for cocaine.  Discussed with the pt per yesterday's phone call with pt's [employer] manager Mr. Khan. pt needs to call his  and attends Court hearing before return to Mr. Khan. Pt understands what is expected if he does not follow the instruction.  Pt reported sleep and eat well. He tolerates Abilify and Trazodone well. No SEs. No SI or HI.   Pt was provided MI therapy and is motivated to make changes.
He offered no new complains.
good sleep and appetite. tolerate Abilify and trazodone well. motivated to receive individual therapy.  Need to contact family for further collateral information

## 2019-11-24 NOTE — PROGRESS NOTE BEHAVIORAL HEALTH - NSBHCHARTREVIEWVS_PSY_A_CORE FT
Vital Signs Last 24 Hrs  T(C): 35.9 (24 Nov 2019 08:48), Max: 35.9 (24 Nov 2019 08:48)  T(F): 96.7 (24 Nov 2019 08:48), Max: 96.7 (24 Nov 2019 08:48)  HR: 61 (24 Nov 2019 08:48) (61 - 61)  BP: 115/67 (24 Nov 2019 08:48) (115/67 - 115/67)  BP(mean): --  RR: 18 (24 Nov 2019 08:48) (18 - 18)  SpO2: --

## 2019-11-24 NOTE — PROGRESS NOTE BEHAVIORAL HEALTH - NSBHFUPINTERVALCCFT_PSY_A_CORE
" I am feeling ok , wants get d/c"
"you tell me what to do"
pt is isolative, without acute c/o.  mood neutral, states he doesn't think he needs to be here.  Denies s/h ideation, again states he came in because of legal issues
pt is isolative, without acute c/o.  mood neutral, states he doesn't think he needs to be here.  Denies s/h ideation, again states he came in because of legal issues
none

## 2019-11-25 VITALS
TEMPERATURE: 97 F | SYSTOLIC BLOOD PRESSURE: 127 MMHG | RESPIRATION RATE: 16 BRPM | DIASTOLIC BLOOD PRESSURE: 55 MMHG | HEART RATE: 59 BPM

## 2019-11-25 PROCEDURE — 99238 HOSP IP/OBS DSCHRG MGMT 30/<: CPT

## 2019-11-25 RX ORDER — TRAZODONE HCL 50 MG
1 TABLET ORAL
Qty: 14 | Refills: 0
Start: 2019-11-25

## 2019-11-25 RX ORDER — LANOLIN ALCOHOL/MO/W.PET/CERES
1 CREAM (GRAM) TOPICAL
Qty: 14 | Refills: 0
Start: 2019-11-25

## 2019-11-25 RX ORDER — ARIPIPRAZOLE 15 MG/1
1 TABLET ORAL
Qty: 14 | Refills: 0
Start: 2019-11-25

## 2019-11-25 RX ADMIN — ARIPIPRAZOLE 5 MILLIGRAM(S): 15 TABLET ORAL at 08:17

## 2019-11-25 NOTE — DISCHARGE NOTE BEHAVIORAL HEALTH - NSBHDCSWCOMMENTSFT_PSY_A_CORE
Discharge Summary Faxed to [facility] [phone] on 11/25/2019 at 2pm.

## 2019-11-25 NOTE — DISCHARGE NOTE BEHAVIORAL HEALTH - MEDICATION SUMMARY - MEDICATIONS TO STOP TAKING
I will STOP taking the medications listed below when I get home from the hospital:    oxycodone-acetaminophen 5 mg-300 mg oral tablet  -- 1 tab(s) by mouth every 6 hours

## 2019-11-25 NOTE — DISCHARGE NOTE BEHAVIORAL HEALTH - MEDICATION SUMMARY - MEDICATIONS TO TAKE
I will START or STAY ON the medications listed below when I get home from the hospital:    traZODone 50 mg oral tablet  -- 1 tab(s) by mouth once a day (at bedtime) until stopped by MD  -- Indication: For Bipolar 1 disorder    ARIPiprazole 5 mg oral tablet  -- 1 tab(s) by mouth once a day until stopped by MD  -- Indication: For Bipolar 1 disorder    melatonin 5 mg oral tablet  -- 1 tab(s) by mouth once a day (at bedtime) until stopped by MD  -- Indication: For Bipolar 1 disorder

## 2019-11-25 NOTE — DISCHARGE NOTE BEHAVIORAL HEALTH - HPI (INCLUDE ILLNESS QUALITY, SEVERITY, DURATION, TIMING, CONTEXT, MODIFYING FACTORS, ASSOCIATED SIGNS AND SYMPTOMS)
40 y/o  male with longstanding opiate abuse hx, but states he has been sober for over 2 yrs and off MMTP for 7 mos.  He is enrolled in court mandated treatment program , but pt had issues complying with program , and so he came to ED in attempt to avoid issues with missing court mandated treatment, and with his .  He stated that he was suicidal initially in ED, but clearly and consistently denies that since admission.  He stated he had worked very hard to get where he is today, loves his family and wanted to continue to do well.  He was admitted on a voluntary status    No psychosis or mood sx evident since admission. He was started on Abilify without side effect issues, and was not a management problem.  He agreed with plan for outpt f/u and was able to be d/c'd to home with outpt dual focus f/u in place.  No interest in any anti-craving/substance abuse meds

## 2019-11-25 NOTE — PROGRESS NOTE ADULT - SUBJECTIVE AND OBJECTIVE BOX
pt stable alert in NAD  no new complaints    BIPOLAR 1 DISORDER  ^PSYCH EVAL/ DEPRESSED  No pertinent family history in first degree relatives  Handoff  MEWS Score  Cocaine abuse  Alcohol abuse  Back pain  Intravenous drug abuse  Bipolar 1 disorder  Bipolar 1 disorder  Opioid dependence in remission  Mood disorder  No significant past surgical history  PSYCH EVAL/ DEPRESSED  90+    HEALTH ISSUES - PROBLEM Dx:  Bipolar 1 disorder: Bipolar 1 disorder  Opioid dependence in remission  Mood disorder        PAST MEDICAL & SURGICAL HISTORY:  Cocaine abuse  Alcohol abuse  Back pain  Intravenous drug abuse  No significant past surgical history    No Known Allergies      FAMILY HISTORY:  No pertinent family history in first degree relatives      ARIPiprazole 5 milliGRAM(s) Oral daily  diphenhydrAMINE 50 milliGRAM(s) Oral every 6 hours PRN  haloperidol     Tablet 5 milliGRAM(s) Oral every 6 hours PRN  hydrOXYzine hydrochloride 50 milliGRAM(s) Oral every 6 hours PRN  melatonin. 5 milliGRAM(s) Oral at bedtime  traZODone 50 milliGRAM(s) Oral at bedtime      T(C): 36.2 (11-25-19 @ 05:53), Max: 36.6 (11-24-19 @ 17:50)  HR: 62 (11-25-19 @ 05:53) (52 - 62)  BP: 123/59 (11-25-19 @ 05:53) (115/67 - 129/60)  RR: 20 (11-25-19 @ 05:53) (18 - 20)  SpO2: --    PE;  general:  no changes noted in nad    Lungs:    Heart:    EXT:    Neuro: alert no defciits                          CAPILLARY BLOOD GLUCOSE

## 2019-11-25 NOTE — PROGRESS NOTE ADULT - PROBLEM SELECTOR PLAN 1
continue present treatment as per psych plan as reviewed  Medically stable with no new changes in treatment  will continue to monitor medical status while being treated on psych  avoid narcotric for pain kaelyn  can use nsaid as needed

## 2019-11-29 DIAGNOSIS — B19.20 UNSPECIFIED VIRAL HEPATITIS C WITHOUT HEPATIC COMA: ICD-10-CM

## 2019-11-29 DIAGNOSIS — F10.10 ALCOHOL ABUSE, UNCOMPLICATED: ICD-10-CM

## 2019-11-29 DIAGNOSIS — F31.9 BIPOLAR DISORDER, UNSPECIFIED: ICD-10-CM

## 2019-11-29 DIAGNOSIS — F17.210 NICOTINE DEPENDENCE, CIGARETTES, UNCOMPLICATED: ICD-10-CM

## 2019-11-29 DIAGNOSIS — F14.10 COCAINE ABUSE, UNCOMPLICATED: ICD-10-CM

## 2019-11-29 DIAGNOSIS — F11.11 OPIOID ABUSE, IN REMISSION: ICD-10-CM

## 2020-06-03 NOTE — CONSULT NOTE ADULT - OPHTHALMOLOGIC
negative I have personally performed a face to face diagnostic evaluation on this patient. I have reviewed the ACP note and agree with the history, exam and plan of care, except as noted.

## 2021-05-04 NOTE — CONSULT NOTE ADULT - ATTENDING COMMENTS
Spoke with patient   Still on loading dose of Pacerone until 5/8 patient will continue 2.5mg daily  INR one week   
Patient seen and examined with Dr. Peguero.  I agree with her assessment and plan.  The patient is a heavy IV heroin user admitted with use complication of abscess.  He is currently in withdrawal with last use yesterday.  Methadone taper and PRN medications to control withdrawal symptoms.
Discussed with pt. Questions addressed

## 2021-06-25 NOTE — ED ADULT NURSE NOTE - PERSON CONFIRMING BED ASSIGNMENT
Hello,    Recently an order was placed with [facility] to assist with Physical Therapy and Occupational Therapy in the home.    We are happy to see this patient for homecare, however at this time we are currently booking out a little outside of 48hrs to set up a new patient with these services.     At this time, our soonest availability would be to see the patient on Wednesday, June 9th.     If this is acceptable and the patient is safe to wait until this date to start home care, please respond to this message thread giving the OK or send through another order in Epic that is reflective of this start date.     If you have any questions or concerns, please call our office at [phone].    Thank you,    Leslee Cervantes    [facility]   Angel Luis VELÁSQUEZ

## 2021-12-02 ENCOUNTER — EMERGENCY (EMERGENCY)
Facility: HOSPITAL | Age: 41
LOS: 0 days | Discharge: HOME | End: 2021-12-03
Attending: EMERGENCY MEDICINE | Admitting: EMERGENCY MEDICINE
Payer: MEDICAID

## 2021-12-02 VITALS
HEART RATE: 73 BPM | HEIGHT: 73 IN | RESPIRATION RATE: 18 BRPM | DIASTOLIC BLOOD PRESSURE: 88 MMHG | TEMPERATURE: 97 F | SYSTOLIC BLOOD PRESSURE: 146 MMHG | OXYGEN SATURATION: 99 % | WEIGHT: 279.99 LBS

## 2021-12-02 DIAGNOSIS — R68.83 CHILLS (WITHOUT FEVER): ICD-10-CM

## 2021-12-02 DIAGNOSIS — M79.10 MYALGIA, UNSPECIFIED SITE: ICD-10-CM

## 2021-12-02 DIAGNOSIS — R05.1 ACUTE COUGH: ICD-10-CM

## 2021-12-02 DIAGNOSIS — Z20.822 CONTACT WITH AND (SUSPECTED) EXPOSURE TO COVID-19: ICD-10-CM

## 2021-12-02 PROCEDURE — 99284 EMERGENCY DEPT VISIT MOD MDM: CPT

## 2021-12-02 NOTE — ED PROVIDER NOTE - PHYSICAL EXAMINATION
VITAL SIGNS: I have reviewed nursing notes and confirm.  CONSTITUTIONAL: calm male, non-toxic, NAD  SKIN: Warm dry, normal skin turgor  HEAD: NCAT  EYES: EOMI, no scleral icterus  ENT: Moist mucous membranes, normal pharynx with no erythema or exudates  NECK: Supple; non tender.  CARD: RRR, no murmurs, rubs or gallops  RESP: clear to ausculation b/l.  No rales, rhonchi, or wheezing.  EXT: Full ROM  NEURO: Normal gait.  PSYCH: Cooperative, appropriate.

## 2021-12-02 NOTE — ED PROVIDER NOTE - NSICDXPASTMEDICALHX_GEN_ALL_CORE_FT
PAST MEDICAL HISTORY:  Alcohol abuse     Back pain     Cocaine abuse     Intravenous drug abuse

## 2021-12-02 NOTE — ED PROVIDER NOTE - NS ED ROS FT
Constitutional: +chills, body aches  Eyes/ENT: (-) runny nose  Cardiovascular: (-) chest pain  Respiratory: (+) cough  Gastrointestinal: (-) vomiting, (-) diarrhea  : (-) dysuria   Musculoskeletal: (-) joint pain  Integumentary: (-) rash  Neurological: (-) LOC  Allergic/Immunologic: (-) pruritus

## 2021-12-02 NOTE — ED PROVIDER NOTE - ATTENDING CONTRIBUTION TO CARE
40 yo M presnets requesting COVID test.  + body aches.  Had + exposure.  no fevers no CP, no SOB. On exam pt in NAD AAO x 3, well appearing, steady gait

## 2021-12-02 NOTE — ED PROVIDER NOTE - OBJECTIVE STATEMENT
41M no reported PMHx presents requesting COVID test. States his coworker tested positive for COVID and his family was exposed. Reports 6 days of body aches, cough, and chills. No chest pain, sob, abd pain, n/v/d.

## 2021-12-02 NOTE — ED PROVIDER NOTE - PATIENT PORTAL LINK FT
You can access the FollowMyHealth Patient Portal offered by [facility] by registering at the following website: [email or web address]. By joining [employer]’s FollowMyHealth portal, you will also be able to view your health information using other applications (apps) compatible with our system.

## 2021-12-03 LAB — SARS-COV-2 RNA SPEC QL NAA+PROBE: SIGNIFICANT CHANGE UP

## 2022-02-23 ENCOUNTER — TRANSCRIPTION ENCOUNTER (OUTPATIENT)
Age: 42
End: 2022-02-23

## 2022-07-21 NOTE — CONSULT NOTE ADULT - MINUTES
15
53 yo female with PMH of hypothyroidism. Pt states she presented with RUQ pain in May, that is intermittent and is related with food intake.   She was assessed by gastroenterology and was informed she has sludge in the GB. The pt was referred to Dr. Mejía for surgical treatement.    Hida/CCK with EF 9%    Pt underwent lap cholecystectomy surgery, pain tolerated, tolerating diet, able to go home today
30
53 yo female with PMH of hypothyroidism. Pt states she presented with RUQ pain in May, that is intermittent and is related with food intake. She was assessed by gastroenterology and was informed she has sludge in the GB. The pt was referred to Dr. Mejía for surgical treatement.     Hida/CCK with EF 9%    Patient underwent lap cholecystectomy surgery. She had multiple episodes of vomiting and pain POD0, which subsided. Tolerating diet and stable for discharge.

## 2023-04-17 NOTE — PATIENT PROFILE BEHAVIORAL HEALTH - NSBHHOMTHTS_PSY_A_CORE
Order for portable oxygen was not placed following discharge.  Writer will re-evaluate patient's oxygen needs and access for POC on 4/19/23 at 1415 prior to appointment w/Tyesha Cuba NP.     
absent

## 2023-09-24 ENCOUNTER — NON-APPOINTMENT (OUTPATIENT)
Age: 43
End: 2023-09-24

## 2024-01-09 ENCOUNTER — NON-APPOINTMENT (OUTPATIENT)
Age: 44
End: 2024-01-09

## 2024-02-07 ENCOUNTER — EMERGENCY (EMERGENCY)
Facility: HOSPITAL | Age: 44
LOS: 0 days | Discharge: LEFT BEFORE DISCHARGE | End: 2024-02-07
Attending: EMERGENCY MEDICINE
Payer: MEDICAID

## 2024-02-07 VITALS
DIASTOLIC BLOOD PRESSURE: 81 MMHG | SYSTOLIC BLOOD PRESSURE: 178 MMHG | TEMPERATURE: 98 F | RESPIRATION RATE: 16 BRPM | HEART RATE: 81 BPM | WEIGHT: 309.97 LBS | OXYGEN SATURATION: 97 % | HEIGHT: 73 IN

## 2024-02-07 DIAGNOSIS — F17.200 NICOTINE DEPENDENCE, UNSPECIFIED, UNCOMPLICATED: ICD-10-CM

## 2024-02-07 DIAGNOSIS — R07.89 OTHER CHEST PAIN: ICD-10-CM

## 2024-02-07 DIAGNOSIS — M54.2 CERVICALGIA: ICD-10-CM

## 2024-02-07 DIAGNOSIS — Z53.29 PROCEDURE AND TREATMENT NOT CARRIED OUT BECAUSE OF PATIENT'S DECISION FOR OTHER REASONS: ICD-10-CM

## 2024-02-07 DIAGNOSIS — R20.2 PARESTHESIA OF SKIN: ICD-10-CM

## 2024-02-07 LAB
ALBUMIN SERPL ELPH-MCNC: 4.4 G/DL — SIGNIFICANT CHANGE UP (ref 3.5–5.2)
ALP SERPL-CCNC: 59 U/L — SIGNIFICANT CHANGE UP (ref 30–115)
ALT FLD-CCNC: 47 U/L — HIGH (ref 0–41)
ANION GAP SERPL CALC-SCNC: 9 MMOL/L — SIGNIFICANT CHANGE UP (ref 7–14)
APTT BLD: 31 SEC — SIGNIFICANT CHANGE UP (ref 27–39.2)
AST SERPL-CCNC: 23 U/L — SIGNIFICANT CHANGE UP (ref 0–41)
BASOPHILS # BLD AUTO: 0.07 K/UL — SIGNIFICANT CHANGE UP (ref 0–0.2)
BASOPHILS NFR BLD AUTO: 0.7 % — SIGNIFICANT CHANGE UP (ref 0–1)
BILIRUB SERPL-MCNC: <0.2 MG/DL — SIGNIFICANT CHANGE UP (ref 0.2–1.2)
BUN SERPL-MCNC: 19 MG/DL — SIGNIFICANT CHANGE UP (ref 10–20)
CALCIUM SERPL-MCNC: 9.2 MG/DL — SIGNIFICANT CHANGE UP (ref 8.4–10.5)
CHLORIDE SERPL-SCNC: 104 MMOL/L — SIGNIFICANT CHANGE UP (ref 98–110)
CO2 SERPL-SCNC: 25 MMOL/L — SIGNIFICANT CHANGE UP (ref 17–32)
CREAT SERPL-MCNC: 1 MG/DL — SIGNIFICANT CHANGE UP (ref 0.7–1.5)
EGFR: 96 ML/MIN/1.73M2 — SIGNIFICANT CHANGE UP
EOSINOPHIL # BLD AUTO: 0.15 K/UL — SIGNIFICANT CHANGE UP (ref 0–0.7)
EOSINOPHIL NFR BLD AUTO: 1.4 % — SIGNIFICANT CHANGE UP (ref 0–8)
GLUCOSE SERPL-MCNC: 120 MG/DL — HIGH (ref 70–99)
HCT VFR BLD CALC: 44.4 % — SIGNIFICANT CHANGE UP (ref 42–52)
HGB BLD-MCNC: 15.4 G/DL — SIGNIFICANT CHANGE UP (ref 14–18)
IMM GRANULOCYTES NFR BLD AUTO: 0.8 % — HIGH (ref 0.1–0.3)
INR BLD: 0.87 RATIO — SIGNIFICANT CHANGE UP (ref 0.65–1.3)
LYMPHOCYTES # BLD AUTO: 2.38 K/UL — SIGNIFICANT CHANGE UP (ref 1.2–3.4)
LYMPHOCYTES # BLD AUTO: 22.3 % — SIGNIFICANT CHANGE UP (ref 20.5–51.1)
MCHC RBC-ENTMCNC: 30.3 PG — SIGNIFICANT CHANGE UP (ref 27–31)
MCHC RBC-ENTMCNC: 34.7 G/DL — SIGNIFICANT CHANGE UP (ref 32–37)
MCV RBC AUTO: 87.2 FL — SIGNIFICANT CHANGE UP (ref 80–94)
MONOCYTES # BLD AUTO: 0.82 K/UL — HIGH (ref 0.1–0.6)
MONOCYTES NFR BLD AUTO: 7.7 % — SIGNIFICANT CHANGE UP (ref 1.7–9.3)
NEUTROPHILS # BLD AUTO: 7.15 K/UL — HIGH (ref 1.4–6.5)
NEUTROPHILS NFR BLD AUTO: 67.1 % — SIGNIFICANT CHANGE UP (ref 42.2–75.2)
NRBC # BLD: 0 /100 WBCS — SIGNIFICANT CHANGE UP (ref 0–0)
NT-PROBNP SERPL-SCNC: <5 PG/ML — SIGNIFICANT CHANGE UP (ref 0–300)
PLATELET # BLD AUTO: 262 K/UL — SIGNIFICANT CHANGE UP (ref 130–400)
PMV BLD: 10.4 FL — SIGNIFICANT CHANGE UP (ref 7.4–10.4)
POTASSIUM SERPL-MCNC: 4.9 MMOL/L — SIGNIFICANT CHANGE UP (ref 3.5–5)
POTASSIUM SERPL-SCNC: 4.9 MMOL/L — SIGNIFICANT CHANGE UP (ref 3.5–5)
PROT SERPL-MCNC: 6.8 G/DL — SIGNIFICANT CHANGE UP (ref 6–8)
PROTHROM AB SERPL-ACNC: 9.9 SEC — LOW (ref 9.95–12.87)
RBC # BLD: 5.09 M/UL — SIGNIFICANT CHANGE UP (ref 4.7–6.1)
RBC # FLD: 12.6 % — SIGNIFICANT CHANGE UP (ref 11.5–14.5)
SODIUM SERPL-SCNC: 138 MMOL/L — SIGNIFICANT CHANGE UP (ref 135–146)
TROPONIN T, HIGH SENSITIVITY RESULT: 10 NG/L — SIGNIFICANT CHANGE UP (ref 6–21)
TROPONIN T, HIGH SENSITIVITY RESULT: 11 NG/L — SIGNIFICANT CHANGE UP (ref 6–21)
WBC # BLD: 10.65 K/UL — SIGNIFICANT CHANGE UP (ref 4.8–10.8)
WBC # FLD AUTO: 10.65 K/UL — SIGNIFICANT CHANGE UP (ref 4.8–10.8)

## 2024-02-07 PROCEDURE — 94640 AIRWAY INHALATION TREATMENT: CPT

## 2024-02-07 PROCEDURE — 93010 ELECTROCARDIOGRAM REPORT: CPT

## 2024-02-07 PROCEDURE — 85610 PROTHROMBIN TIME: CPT

## 2024-02-07 PROCEDURE — 83880 ASSAY OF NATRIURETIC PEPTIDE: CPT

## 2024-02-07 PROCEDURE — 85730 THROMBOPLASTIN TIME PARTIAL: CPT

## 2024-02-07 PROCEDURE — 93005 ELECTROCARDIOGRAM TRACING: CPT

## 2024-02-07 PROCEDURE — 85025 COMPLETE CBC W/AUTO DIFF WBC: CPT

## 2024-02-07 PROCEDURE — 80053 COMPREHEN METABOLIC PANEL: CPT

## 2024-02-07 PROCEDURE — 99284 EMERGENCY DEPT VISIT MOD MDM: CPT | Mod: 25

## 2024-02-07 PROCEDURE — 36415 COLL VENOUS BLD VENIPUNCTURE: CPT

## 2024-02-07 PROCEDURE — 71045 X-RAY EXAM CHEST 1 VIEW: CPT

## 2024-02-07 PROCEDURE — 99285 EMERGENCY DEPT VISIT HI MDM: CPT | Mod: 25

## 2024-02-07 PROCEDURE — 84484 ASSAY OF TROPONIN QUANT: CPT

## 2024-02-07 PROCEDURE — 71045 X-RAY EXAM CHEST 1 VIEW: CPT | Mod: 26

## 2024-02-07 RX ORDER — ASPIRIN/CALCIUM CARB/MAGNESIUM 324 MG
324 TABLET ORAL ONCE
Refills: 0 | Status: COMPLETED | OUTPATIENT
Start: 2024-02-07 | End: 2024-02-07

## 2024-02-07 RX ORDER — IPRATROPIUM/ALBUTEROL SULFATE 18-103MCG
3 AEROSOL WITH ADAPTER (GRAM) INHALATION ONCE
Refills: 0 | Status: COMPLETED | OUTPATIENT
Start: 2024-02-07 | End: 2024-02-07

## 2024-02-07 RX ADMIN — Medication 324 MILLIGRAM(S): at 08:15

## 2024-02-07 RX ADMIN — Medication 3 MILLILITER(S): at 08:18

## 2024-02-07 NOTE — ED PROVIDER NOTE - PHYSICAL EXAMINATION
Vital Signs: I have reviewed the initial vital signs.  Constitutional: NAD, well-nourished, appears stated age, no acute distress.  HEENT: Airway patent, moist MM, no erythema/swelling/deformity of oral structures. EOMI, PERRLA.  CV: regular rate, regular rhythm, well-perfused extremities, 2+ b/l DP and radial pulses equal.  Lungs: +wheezing bilaterally, no increased WOB.  ABD: NTND, no guarding or rebound, no pulsatile mass, no hernias.   MSK: Neck supple, nontender, nl ROM, no stepoff. Chest nontender. Back nontender. Ext nontender, nl rom, no deformity.   INTEG: Skin warm, dry, no rash.  NEURO: A&Ox3, normal strength, nl sensation throughout, normal speech.   PSYCH: Calm, cooperative, normal affect and interaction.

## 2024-02-07 NOTE — CONSULT NOTE ADULT - EYES
Detail Level: Detailed Size Of Lesion In Cm (Optional): 0.5 X Size Of Lesion In Cm (Optional): 0 EOMI; PERRL; no drainage or redness

## 2024-02-07 NOTE — ED PROVIDER NOTE - OBJECTIVE STATEMENT
43-year-old male no past medical history presents to the ED complaining of left-sided chest pain radiating down left arm intermittently for couple days worse this morning.  Patient states felt tightness around chest.  Patient complaining of shortness of breath with cough no fever, chills, leg pain or leg swelling.

## 2024-02-07 NOTE — ED ADULT NURSE NOTE - NSFALLUNIVINTERV_ED_ALL_ED
Bed/Stretcher in lowest position, wheels locked, appropriate side rails in place/Call bell, personal items and telephone in reach/Instruct patient to call for assistance before getting out of bed/chair/stretcher/Non-slip footwear applied when patient is off stretcher/[place] to call system/Physically safe environment - no spills, clutter or unnecessary equipment/Purposeful proactive rounding/Room/bathroom lighting operational, light cord in reach

## 2024-02-07 NOTE — ED PROVIDER NOTE - CLINICAL SUMMARY MEDICAL DECISION MAKING FREE TEXT BOX
43-year-old male history of prior IVDA, presenting for evaluation of chest discomfort with associated neck pain and paresthesia to left upper extremity.  No other acute complaints.  Smokes tobacco marijuana, denies other current substance use.  No lower extremity pain or swelling.  No trauma.  States that his last stress test was approximately 2017.  Well appearing, NAD, non toxic. NCAT PERRLA EOMI neck supple non tender normal wob cta bl rrr abdomen s nt nd no rebound no guarding WWPx4 neuro non focal 2+ equal pulses, less than 2-second capillary fill.  No tenderness palpation to left neck, mild ttp L chest. No rash 43-year-old male history of prior IVDA, presenting for evaluation of chest discomfort with associated neck pain and paresthesia to left upper extremity.  No other acute complaints.  Smokes tobacco marijuana, denies other current substance use.  No lower extremity pain or swelling.  No trauma.  States that his last stress test was approximately 2017.  Well appearing, NAD, non toxic. NCAT PERRLA EOMI neck supple non tender normal wob cta bl rrr abdomen s nt nd no rebound no guarding WWPx4 neuro non focal 2+ equal pulses, less than 2-second capillary fill.  No tenderness palpation to left neck, mild ttp L chest. No rash.   labs ekg imaging reviewed. pt eloped prior to full eval

## 2024-03-11 NOTE — PROGRESS NOTE BEHAVIORAL HEALTH - NS ED BHA MSE GENERAL APPEARANCE
Well developed/No deformities present
Well developed/No deformities present
No deformities present/Well developed
Patient/Caregiver provided printed discharge information.

## 2024-09-09 NOTE — PROGRESS NOTE BEHAVIORAL HEALTH - NSBHFUPADMVIOLPROP_PSY_A_CORE
I recommend the following vaccines -  TDAP (tetanus, diptheriae, pertussis) vaccine every 10 years.     Annual FLU every September, October.    Stay up to date with COVID vaccines.     
Yes

## 2024-09-11 NOTE — DISCHARGE NOTE BEHAVIORAL HEALTH - NS MD DC FALL RISK RISK
09/10/24 0919   Patient Assessment/Suction   Level of Consciousness (AVPU) alert   Respiratory Effort Normal;Unlabored   Expansion/Accessory Muscles/Retractions no use of accessory muscles   Rhythm/Pattern, Respiratory unlabored;pattern regular;depth regular;no shortness of breath reported   PRE-TX-O2   Device (Oxygen Therapy) room air   SpO2 99 %   Pulse Oximetry Type Continuous   $ Pulse Oximetry - Multiple Charge Pulse Oximetry - Multiple   Pulse 60   Resp 20   Aerosol Therapy   $ Aerosol Therapy Charges PRN treatment not required   Daily Review of Necessity (SVN) completed   Respiratory Treatment Status (SVN) PRN treatment not required   Education   $ Education 15 min;Other (see comment)        For information on Fall & Injury Prevention, visit [email or web address]

## 2024-10-03 NOTE — ED ADULT NURSE NOTE - TEMPLATE LIST FOR HEAD TO TOE ASSESSMENT
reviewed and agreed with, or any disagreements were addressed in the HPI.     Review of Systems   Constitutional:  Positive for unexpected weight change.   HENT: Negative.     Respiratory: Negative.     Cardiovascular: Negative.    Gastrointestinal: Negative.    Genitourinary: Negative.    Musculoskeletal: Negative.    Neurological: Negative.    Hematological: Negative.    Psychiatric/Behavioral: Negative.     All other systems reviewed and are negative.        MEDICAL HISTORY   has no past medical history on file.    History reviewed. No pertinent surgical history.   CURRENTMEDICATIONS       Previous Medications    BUPROPION (WELLBUTRIN XL) 300 MG EXTENDED RELEASE TABLET    Take 1 tablet by mouth daily    LISINOPRIL (PRINIVIL;ZESTRIL) 10 MG TABLET    Take 1 tablet by mouth daily      SCREENINGS          [place] Coma Scale  Eye Opening: Spontaneous  Best Verbal Response: Oriented  Best Motor Response: Obeys commands  [place] Coma Scale Score: 15                CIWA Assessment  BP: (!) 138/111  Pulse: (!) 106                  PHYSICAL EXAM :  ED Triage Vitals   BP Systolic BP Percentile Diastolic BP Percentile Temp Temp Source Pulse Respirations SpO2   10/03/24 1121 -- -- 10/03/24 1120 10/03/24 1120 10/03/24 1120 10/03/24 1120 10/03/24 1120   (!) 146/110   97.9 °F (36.6 °C) Oral (!) 119 18 100 %      Height Weight - Scale         10/03/24 1120 10/03/24 1120         1.803 m (5' 11\") 56.5 kg (124 lb 8 oz)            Physical Exam  Vitals reviewed.   Constitutional:       General: He is not in acute distress.     Appearance: Normal appearance. He is not ill-appearing.   HENT:      Head: Normocephalic and atraumatic.      Mouth/Throat:      Mouth: Mucous membranes are dry.   Eyes:      Conjunctiva/sclera: Conjunctivae normal.      Pupils: Pupils are equal, round, and reactive to light.   Cardiovascular:      Rate and Rhythm: Normal rate and regular rhythm.      Pulses: Normal pulses.   Pulmonary:      Effort: Pulmonary 
General

## 2025-01-13 ENCOUNTER — EMERGENCY (EMERGENCY)
Facility: HOSPITAL | Age: 45
LOS: 0 days | Discharge: HOME | End: 2025-01-13
Attending: EMERGENCY MEDICINE
Payer: MEDICAID

## 2025-01-13 VITALS
RESPIRATION RATE: 16 BRPM | DIASTOLIC BLOOD PRESSURE: 88 MMHG | OXYGEN SATURATION: 96 % | TEMPERATURE: 99 F | SYSTOLIC BLOOD PRESSURE: 170 MMHG | HEART RATE: 82 BPM

## 2025-01-13 VITALS — WEIGHT: 315 LBS

## 2025-01-13 DIAGNOSIS — F10.11 ALCOHOL ABUSE, IN REMISSION: ICD-10-CM

## 2025-01-13 DIAGNOSIS — J11.1 INFLUENZA DUE TO UNIDENTIFIED INFLUENZA VIRUS WITH OTHER RESPIRATORY MANIFESTATIONS: ICD-10-CM

## 2025-01-13 DIAGNOSIS — R51.9 HEADACHE, UNSPECIFIED: ICD-10-CM

## 2025-01-13 DIAGNOSIS — R50.9 FEVER, UNSPECIFIED: ICD-10-CM

## 2025-01-13 DIAGNOSIS — R05.9 COUGH, UNSPECIFIED: ICD-10-CM

## 2025-01-13 LAB
FLUAV AG NPH QL: DETECTED
FLUBV AG NPH QL: SIGNIFICANT CHANGE UP
RSV RNA NPH QL NAA+NON-PROBE: SIGNIFICANT CHANGE UP
SARS-COV-2 RNA SPEC QL NAA+PROBE: SIGNIFICANT CHANGE UP

## 2025-01-13 PROCEDURE — 0241U: CPT

## 2025-01-13 PROCEDURE — 99283 EMERGENCY DEPT VISIT LOW MDM: CPT

## 2025-01-13 PROCEDURE — 99284 EMERGENCY DEPT VISIT MOD MDM: CPT

## 2025-01-13 RX ORDER — OSELTAMIVIR 75 MG/1
1 CAPSULE ORAL
Qty: 10 | Refills: 0
Start: 2025-01-13 | End: 2025-01-17

## 2025-01-13 NOTE — ED PROVIDER NOTE - OBJECTIVE STATEMENT
44-year-old male, with past medical history of EtOH use disorder, IVDA, bipolar disorder, presents to the ED for flulike symptoms including fever (103.5), chills, sweats, myalgias, headache, cough.  His partner who he lives with is sick with similar symptoms.  Requesting Tamiflu.

## 2025-01-13 NOTE — ED PROVIDER NOTE - PATIENT PORTAL LINK FT
You can access the FollowMyHealth Patient Portal offered by [facility] by registering at the following website: [email or web address]. By joining [employer]’s FollowMyHealth portal, you will also be able to view your health information using other applications (apps) compatible with our system.

## 2025-01-13 NOTE — ED ADULT NURSE NOTE - NSSUHOSCREENINGYN_ED_ALL_ED
[FreeTextEntry3] : + inflamed, waxy, keratotic papule; L medial lower eyelid near canthus\par \par Scaling waxy stuck on papules; upper back\par Multiple benign nevi were noted.  Yes - the patient is able to be screened

## 2025-01-13 NOTE — ED PROVIDER NOTE - ATTENDING APP SHARED VISIT CONTRIBUTION OF CARE
Patient complains of flulike symptoms.  Vital signs noted.  No apparent distress.  Viral swab confirms influenza.  Tamiflu prescribed.

## 2025-01-13 NOTE — ED ADULT NURSE NOTE - NSFALLUNIVINTERV_ED_ALL_ED
Bed/Stretcher in lowest position, wheels locked, appropriate side rails in place/Call bell, personal items and telephone in reach/Instruct patient to call for assistance before getting out of bed/chair/stretcher/Non-slip footwear applied when patient is off stretcher/[place] to call system/Physically safe environment - no spills, clutter or unnecessary equipment/Purposeful proactive rounding/Room/bathroom lighting operational, light cord in reach

## 2025-01-13 NOTE — ED ADULT NURSE NOTE - NSICDXPASTMEDICALHX_GEN_ALL_CORE_FT
PAST MEDICAL HISTORY:  Alcohol abuse     Back pain     Cocaine abuse     Intravenous drug abuse     
Surgeon - Dr Nancy Grey

## 2025-01-13 NOTE — ED PROVIDER NOTE - NSFOLLOWUPINSTRUCTIONS_ED_ALL_ED_FT
FOLLOW UP WITH YOUR DOCTOR THIS WEEK FOR REEVALUATION.     RETURN TO ED IMMEDIATELY WITH ANY WORSENING SYMPTOMS, CHEST PAIN, SHORTNESS OF BREATH, ABDOMINAL PAIN, FEVERS, WEAKNESS, DIZZINESS, PERSISTENT OR SEVERE HEADACHE OR ANY OTHER CONCERNS.    If you have been prescribed medications such as Tamiflu or oxygen, be sure to use them as directed to help treat the flu. Otherwise, care involves managing symptoms: be sure to drink plenty of water, at least 8 glasses a day unless you have been advised to restrict fluid intake, in order to avoid dehydration. You can take over the counter medications like Tylenol, Aspirin, or antiinflammatories like Ibuprofen or naproxen for fevers and body pains unless you have been advised to avoid any of these medications specifically. It will take some time before you feel 100% again, this is ok. You should seek care from your primary care doctor only if you are continuing for days to have high grade fevers or if you feel like your symptoms have actually gotten worse.     Be sure to get a flu shot every year - the purpose of the shot is not mainly to prevent it although sometimes it does, but really it is to keep your flu from becoming very severe - remember, every year the flu causes deaths that we would like to prevent!

## 2025-01-13 NOTE — ED PROVIDER NOTE - PHYSICAL EXAMINATION
GENERAL: Well-developed; well-nourished; in no acute distress.   SKIN: warm, dry  HEAD: Normocephalic; atraumatic.  EYES: PERRLA, EOMI, no conjunctival erythema  ENT: No nasal discharge; airway clear. MMM  NECK: Supple; non tender.  CARD: Regular rate and rhythm. S1, S2 normal; no murmurs, gallops, or rubs.   RESP: Dry cough. LCTAB; No wheezes, rales, rhonchi, or stridor.  ABD: soft, nontender, nondistended  EXT: Normal ROM.  No LE TTP or edema bilaterally.  NEURO: A/ox3, grossly unremarkable  PSYCH: Cooperative, appropriate.

## 2025-07-13 ENCOUNTER — EMERGENCY (EMERGENCY)
Facility: HOSPITAL | Age: 45
LOS: 0 days | Discharge: HOME | End: 2025-07-14
Attending: EMERGENCY MEDICINE
Payer: MEDICAID

## 2025-07-13 VITALS
TEMPERATURE: 98 F | SYSTOLIC BLOOD PRESSURE: 214 MMHG | WEIGHT: 315 LBS | HEART RATE: 75 BPM | DIASTOLIC BLOOD PRESSURE: 92 MMHG | RESPIRATION RATE: 16 BRPM | HEIGHT: 72 IN | OXYGEN SATURATION: 96 %

## 2025-07-13 DIAGNOSIS — R61 GENERALIZED HYPERHIDROSIS: ICD-10-CM

## 2025-07-13 DIAGNOSIS — F31.9 BIPOLAR DISORDER, UNSPECIFIED: ICD-10-CM

## 2025-07-13 DIAGNOSIS — R06.02 SHORTNESS OF BREATH: ICD-10-CM

## 2025-07-13 DIAGNOSIS — K76.0 FATTY (CHANGE OF) LIVER, NOT ELSEWHERE CLASSIFIED: ICD-10-CM

## 2025-07-13 DIAGNOSIS — Z82.49 FAMILY HISTORY OF ISCHEMIC HEART DISEASE AND OTHER DISEASES OF THE CIRCULATORY SYSTEM: ICD-10-CM

## 2025-07-13 DIAGNOSIS — R07.2 PRECORDIAL PAIN: ICD-10-CM

## 2025-07-13 DIAGNOSIS — F17.210 NICOTINE DEPENDENCE, CIGARETTES, UNCOMPLICATED: ICD-10-CM

## 2025-07-13 DIAGNOSIS — R07.89 OTHER CHEST PAIN: ICD-10-CM

## 2025-07-13 DIAGNOSIS — R42 DIZZINESS AND GIDDINESS: ICD-10-CM

## 2025-07-13 LAB
ALBUMIN SERPL ELPH-MCNC: 4.6 G/DL — SIGNIFICANT CHANGE UP (ref 3.5–5.2)
ALP SERPL-CCNC: 70 U/L — SIGNIFICANT CHANGE UP (ref 30–115)
ALT FLD-CCNC: 55 U/L — HIGH (ref 0–41)
ANION GAP SERPL CALC-SCNC: 13 MMOL/L — SIGNIFICANT CHANGE UP (ref 7–14)
APPEARANCE UR: CLEAR — SIGNIFICANT CHANGE UP
AST SERPL-CCNC: 37 U/L — SIGNIFICANT CHANGE UP (ref 0–41)
BACTERIA # UR AUTO: NEGATIVE /HPF — SIGNIFICANT CHANGE UP
BASOPHILS # BLD AUTO: 0.08 K/UL — SIGNIFICANT CHANGE UP (ref 0–0.2)
BASOPHILS NFR BLD AUTO: 0.7 % — SIGNIFICANT CHANGE UP (ref 0–1)
BILIRUB SERPL-MCNC: <0.2 MG/DL — SIGNIFICANT CHANGE UP (ref 0.2–1.2)
BILIRUB UR-MCNC: NEGATIVE — SIGNIFICANT CHANGE UP
BUN SERPL-MCNC: 16 MG/DL — SIGNIFICANT CHANGE UP (ref 10–20)
CALCIUM SERPL-MCNC: 9.7 MG/DL — SIGNIFICANT CHANGE UP (ref 8.4–10.5)
CAST: 0 /LPF — SIGNIFICANT CHANGE UP (ref 0–4)
CHLORIDE SERPL-SCNC: 99 MMOL/L — SIGNIFICANT CHANGE UP (ref 98–110)
CO2 SERPL-SCNC: 24 MMOL/L — SIGNIFICANT CHANGE UP (ref 17–32)
COLOR SPEC: YELLOW — SIGNIFICANT CHANGE UP
CREAT SERPL-MCNC: 1.1 MG/DL — SIGNIFICANT CHANGE UP (ref 0.7–1.5)
DIFF PNL FLD: NEGATIVE — SIGNIFICANT CHANGE UP
EGFR: 85 ML/MIN/1.73M2 — SIGNIFICANT CHANGE UP
EGFR: 85 ML/MIN/1.73M2 — SIGNIFICANT CHANGE UP
EOSINOPHIL # BLD AUTO: 0.25 K/UL — SIGNIFICANT CHANGE UP (ref 0–0.7)
EOSINOPHIL NFR BLD AUTO: 2 % — SIGNIFICANT CHANGE UP (ref 0–8)
GLUCOSE SERPL-MCNC: 125 MG/DL — HIGH (ref 70–99)
GLUCOSE UR QL: NEGATIVE MG/DL — SIGNIFICANT CHANGE UP
HCT VFR BLD CALC: 45.8 % — SIGNIFICANT CHANGE UP (ref 42–52)
HGB BLD-MCNC: 16 G/DL — SIGNIFICANT CHANGE UP (ref 14–18)
IMM GRANULOCYTES NFR BLD AUTO: 0.6 % — HIGH (ref 0.1–0.3)
KETONES UR QL: ABNORMAL MG/DL
LEUKOCYTE ESTERASE UR-ACNC: ABNORMAL
LYMPHOCYTES # BLD AUTO: 28.9 % — SIGNIFICANT CHANGE UP (ref 20.5–51.1)
LYMPHOCYTES # BLD AUTO: 3.55 K/UL — HIGH (ref 1.2–3.4)
MCHC RBC-ENTMCNC: 30.4 PG — SIGNIFICANT CHANGE UP (ref 27–31)
MCHC RBC-ENTMCNC: 34.9 G/DL — SIGNIFICANT CHANGE UP (ref 32–37)
MCV RBC AUTO: 87.1 FL — SIGNIFICANT CHANGE UP (ref 80–94)
MONOCYTES # BLD AUTO: 0.92 K/UL — HIGH (ref 0.1–0.6)
MONOCYTES NFR BLD AUTO: 7.5 % — SIGNIFICANT CHANGE UP (ref 1.7–9.3)
NEUTROPHILS # BLD AUTO: 7.42 K/UL — HIGH (ref 1.4–6.5)
NEUTROPHILS NFR BLD AUTO: 60.3 % — SIGNIFICANT CHANGE UP (ref 42.2–75.2)
NITRITE UR-MCNC: NEGATIVE — SIGNIFICANT CHANGE UP
NRBC BLD AUTO-RTO: 0 /100 WBCS — SIGNIFICANT CHANGE UP (ref 0–0)
NT-PROBNP SERPL-SCNC: <36 PG/ML — SIGNIFICANT CHANGE UP (ref 0–300)
PH UR: 5.5 — SIGNIFICANT CHANGE UP (ref 5–8)
PLATELET # BLD AUTO: 255 K/UL — SIGNIFICANT CHANGE UP (ref 130–400)
PMV BLD: 10.1 FL — SIGNIFICANT CHANGE UP (ref 7.4–10.4)
POTASSIUM SERPL-MCNC: 4.5 MMOL/L — SIGNIFICANT CHANGE UP (ref 3.5–5)
POTASSIUM SERPL-SCNC: 4.5 MMOL/L — SIGNIFICANT CHANGE UP (ref 3.5–5)
PROT SERPL-MCNC: 7.4 G/DL — SIGNIFICANT CHANGE UP (ref 6–8)
PROT UR-MCNC: SIGNIFICANT CHANGE UP MG/DL
RBC # BLD: 5.26 M/UL — SIGNIFICANT CHANGE UP (ref 4.7–6.1)
RBC # FLD: 12.6 % — SIGNIFICANT CHANGE UP (ref 11.5–14.5)
RBC CASTS # UR COMP ASSIST: 0 /HPF — SIGNIFICANT CHANGE UP (ref 0–4)
SODIUM SERPL-SCNC: 136 MMOL/L — SIGNIFICANT CHANGE UP (ref 135–146)
SP GR SPEC: 1.02 — SIGNIFICANT CHANGE UP (ref 1–1.03)
SQUAMOUS # UR AUTO: 0 /HPF — SIGNIFICANT CHANGE UP (ref 0–5)
TROPONIN T, HIGH SENSITIVITY RESULT: 10 NG/L — SIGNIFICANT CHANGE UP (ref 6–21)
TROPONIN T, HIGH SENSITIVITY RESULT: 12 NG/L — SIGNIFICANT CHANGE UP (ref 6–21)
UROBILINOGEN FLD QL: 0.2 MG/DL — SIGNIFICANT CHANGE UP (ref 0.2–1)
WBC # BLD: 12.29 K/UL — HIGH (ref 4.8–10.8)
WBC # FLD AUTO: 12.29 K/UL — HIGH (ref 4.8–10.8)
WBC UR QL: 17 /HPF — HIGH (ref 0–5)

## 2025-07-13 PROCEDURE — 93010 ELECTROCARDIOGRAM REPORT: CPT

## 2025-07-13 PROCEDURE — 87086 URINE CULTURE/COLONY COUNT: CPT

## 2025-07-13 PROCEDURE — 93005 ELECTROCARDIOGRAM TRACING: CPT

## 2025-07-13 PROCEDURE — 71046 X-RAY EXAM CHEST 2 VIEWS: CPT | Mod: 26

## 2025-07-13 PROCEDURE — 99223 1ST HOSP IP/OBS HIGH 75: CPT

## 2025-07-13 PROCEDURE — 81001 URINALYSIS AUTO W/SCOPE: CPT

## 2025-07-13 PROCEDURE — 85025 COMPLETE CBC W/AUTO DIFF WBC: CPT

## 2025-07-13 PROCEDURE — G0378: CPT

## 2025-07-13 PROCEDURE — 96374 THER/PROPH/DIAG INJ IV PUSH: CPT | Mod: XU

## 2025-07-13 PROCEDURE — 99285 EMERGENCY DEPT VISIT HI MDM: CPT | Mod: 25

## 2025-07-13 PROCEDURE — 80053 COMPREHEN METABOLIC PANEL: CPT

## 2025-07-13 PROCEDURE — 83880 ASSAY OF NATRIURETIC PEPTIDE: CPT

## 2025-07-13 PROCEDURE — 71046 X-RAY EXAM CHEST 2 VIEWS: CPT

## 2025-07-13 PROCEDURE — 84484 ASSAY OF TROPONIN QUANT: CPT

## 2025-07-13 PROCEDURE — 36415 COLL VENOUS BLD VENIPUNCTURE: CPT

## 2025-07-13 PROCEDURE — 75574 CT ANGIO HRT W/3D IMAGE: CPT

## 2025-07-13 RX ORDER — ASPIRIN 325 MG
324 TABLET ORAL ONCE
Refills: 0 | Status: COMPLETED | OUTPATIENT
Start: 2025-07-13 | End: 2025-07-13

## 2025-07-13 RX ADMIN — Medication 100 MILLIGRAM(S): at 18:15

## 2025-07-13 RX ADMIN — Medication 324 MILLIGRAM(S): at 18:15

## 2025-07-13 NOTE — ED ADULT NURSE NOTE - OBJECTIVE STATEMENT
Aox4 pt c/o chest pain yesterday which has since resolved but wanted to get checked out. Iv placed, labs sent

## 2025-07-13 NOTE — ED PROVIDER NOTE - CLINICAL SUMMARY MEDICAL DECISION MAKING FREE TEXT BOX
45-year-old male with h/o PTSD, bipolar in ER for evaluation of CP.  No prior cardiac workup.  Labs reviewed: CBC/CMP unremarkable, HST 12 > 10 on repeat.  EKG: NSR@71, RAD, TWI inferior leads (seen on prior EKG), QTc 410.  CXR negative.  Patient placed in EDOU for cardiac monitoring, CCTA in AM.

## 2025-07-13 NOTE — ED ADULT TRIAGE NOTE - CHIEF COMPLAINT QUOTE
pt  c/o midsternal chest pain that began yesterday with shortness of breath and diaphoresis. reports the diaphoresis and shortness of breath have since resolved

## 2025-07-13 NOTE — ED CDU PROVIDER INITIAL DAY NOTE - ATTENDING APP SHARED VISIT CONTRIBUTION OF CARE
45-year-old man with h/o ptsd, bipolar (does not follow with doctors), in ER for evaluation of CP.  Patient states yesterday while shopping he developed midsternal CP associated with diaphoresis.  No N/V.  Symptoms subsided after ~ 30 minutes,, but has continued to have intermittent episodes of CP since then.  Denies any SOB.  No LE pain/swelling.  Has been having intermittent episodes of flank pain for the past 6 months, but denies any pain currently.  No HA/dizziness/syncope.  No recent travel.  + Daily smoker.  No prior cardiac workup. Pt reports he's had similar episodes over the past ~ 6 months, but has not seen a doctor. Was seen in ER for CP 2/2024 but eloped from ER during work-up.   PE - nad, nc/at, eomi, perrl, op - clear, mmm, neck supple, cta b/l, no w/r/r, rrr, abd- soft, nt/nd, nabs, from x 4, no LE swelling/tenderness, A&O x 3, cn 2-12 intact, no focal motor/sensory deficits.   - Cardiac workup

## 2025-07-13 NOTE — ED PROVIDER NOTE - PHYSICAL EXAMINATION
Gen: NAD, AOx3  Head: NCAT  HEENT: PERRL, oral mucosa moist, normal conjunctiva, oropharynx clear without exudate or erythema  Lung: CTAB, no respiratory distress, no wheezing, rales, rhonchi  CV: normal s1/s2, rrr, Normal perfusion, pulses 2+ throughout  Abd: soft, NTND, no CVA tenderness  Genitourinary: no pelvic tenderness  MSK: No edema, no visible deformities, full range of motion in all 4 extremities  Neuro: CN II-XII grossly intact, No focal neurologic deficits  Skin: No rash   Psych: normal affect Gen: NAD, AOx3  Head: NCAT  HEENT: PERRL, oral mucosa moist, normal conjunctiva, oropharynx clear without exudate or erythema  Lung: CTAB, no respiratory distress, no wheezing, rales, rhonchi  CV: normal s1/s2, rrr, Normal perfusion, pulses 2+ throughout  Abd: soft, NTND, no CVA tenderness  Genitourinary: no pelvic tenderness  MSK: no visible deformities, full range of motion in all 4 extremities  Neuro: CN II-XII grossly intact, No focal neurologic deficits  Skin: No rash   Psych: normal affect

## 2025-07-13 NOTE — ED PROVIDER NOTE - OBJECTIVE STATEMENT
25-year-old male with no known past medical history presents complaining of intermittent chest pain for the past 2 days. Patient notes pain to the midsternal region with no radiation and states the episode lasted 40 minutes.  Patient states to have also experienced shortness of breath and diaphoresis. Patient states he experienced 2 episodes yesterday and denies any chest pain/symptoms today. pt denies any other symptoms including fevers, chill, headache, recent illness/travel, cough, abdominal pain. 45-year-old male with no known past medical history presents complaining of intermittent chest pain for the past 2 days. Patient notes pain to the midsternal region with no radiation and states the episode lasted 40 minutes.  Patient states to have also experienced shortness of breath and diaphoresis. Patient states he experienced 2 episodes yesterday and denies any chest pain/symptoms today. pt denies any other symptoms including fevers, chill, headache, recent illness/travel, cough, abdominal pain.

## 2025-07-13 NOTE — ED CDU PROVIDER INITIAL DAY NOTE - OBJECTIVE STATEMENT
45-year-old male with no known past medical history presents complaining of intermittent chest pain for the past 2 days. Patient notes pain to the midsternal region with no radiation and states the episode lasted 40 minutes.  Patient states to have also experienced shortness of breath and diaphoresis. Patient states he experienced 2 episodes yesterday and denies any chest pain/symptoms today. pt denies any other symptoms including fevers, chill, headache, recent illness/travel, cough, abdominal pain.

## 2025-07-13 NOTE — ED CDU PROVIDER INITIAL DAY NOTE - PHYSICAL EXAMINATION
Gen: NAD, AOx3  Head: NCAT  HEENT: PERRL, oral mucosa moist, normal conjunctiva, oropharynx clear without exudate or erythema  Lung: CTAB, no respiratory distress, no wheezing, rales, rhonchi  CV: normal s1/s2, rrr, Normal perfusion, pulses 2+ throughout  Abd: soft, NTND, no CVA tenderness  Genitourinary: no pelvic tenderness  MSK: no visible deformities, full range of motion in all 4 extremities  Neuro: CN II-XII grossly intact, No focal neurologic deficits  Skin: No rash   Psych: normal affect

## 2025-07-13 NOTE — ED PROVIDER NOTE - ATTENDING APP SHARED VISIT CONTRIBUTION OF CARE
45-year-old man with no significant past medical history (does not follow with doctors), in ER for evaluation of CP.  Patient states yesterday while shopping he developed midsternal CP associated with diaphoresis.  No N/V.  Symptoms subsided after ~ 30 minutes,, but has continued to have intermittent episodes of CP since then.  Denies any SOB.  No LE pain/swelling.  Has been having intermittent episodes of flank pain for the past 6 months, but denies any pain currently.  No HA/dizziness/syncope.  No recent travel.  + Daily smoker.  No prior cardiac workup. Pt reports he's had similar episodes over the past ~ 6 months, but has not seen a doctor. Was seen in ER for CP 2/2024 but eloped from ER during work-up.   PE - nad, nc/at, eomi, perrl, op - clear, mmm, neck supple, cta b/l, no w/r/r, rrr, abd- soft, nt/nd, nabs, from x 4, no LE swelling/tenderness, A&O x 3, cn 2-12 intact, no focal motor/sensory deficits.   - Cardiac workup 45-year-old man with h/o ptsd, bipolar (does not follow with doctors), in ER for evaluation of CP.  Patient states yesterday while shopping he developed midsternal CP associated with diaphoresis.  No N/V.  Symptoms subsided after ~ 30 minutes,, but has continued to have intermittent episodes of CP since then.  Denies any SOB.  No LE pain/swelling.  Has been having intermittent episodes of flank pain for the past 6 months, but denies any pain currently.  No HA/dizziness/syncope.  No recent travel.  + Daily smoker.  No prior cardiac workup. Pt reports he's had similar episodes over the past ~ 6 months, but has not seen a doctor. Was seen in ER for CP 2/2024 but eloped from ER during work-up.   PE - nad, nc/at, eomi, perrl, op - clear, mmm, neck supple, cta b/l, no w/r/r, rrr, abd- soft, nt/nd, nabs, from x 4, no LE swelling/tenderness, A&O x 3, cn 2-12 intact, no focal motor/sensory deficits.   - Cardiac workup

## 2025-07-14 VITALS
TEMPERATURE: 98 F | OXYGEN SATURATION: 99 % | HEART RATE: 54 BPM | DIASTOLIC BLOOD PRESSURE: 86 MMHG | SYSTOLIC BLOOD PRESSURE: 149 MMHG | RESPIRATION RATE: 18 BRPM

## 2025-07-14 PROCEDURE — 75574 CT ANGIO HRT W/3D IMAGE: CPT | Mod: 26

## 2025-07-14 PROCEDURE — 99239 HOSP IP/OBS DSCHRG MGMT >30: CPT

## 2025-07-14 RX ORDER — ASPIRIN 325 MG
325 TABLET ORAL ONCE
Refills: 0 | Status: DISCONTINUED | OUTPATIENT
Start: 2025-07-14 | End: 2025-07-14

## 2025-07-14 RX ORDER — ACETAMINOPHEN 500 MG/5ML
975 LIQUID (ML) ORAL ONCE
Refills: 0 | Status: COMPLETED | OUTPATIENT
Start: 2025-07-14 | End: 2025-07-14

## 2025-07-14 RX ORDER — METOPROLOL SUCCINATE 50 MG/1
50 TABLET, EXTENDED RELEASE ORAL ONCE
Refills: 0 | Status: COMPLETED | OUTPATIENT
Start: 2025-07-14 | End: 2025-07-14

## 2025-07-14 RX ADMIN — Medication 975 MILLIGRAM(S): at 10:13

## 2025-07-14 RX ADMIN — METOPROLOL SUCCINATE 50 MILLIGRAM(S): 50 TABLET, EXTENDED RELEASE ORAL at 06:12

## 2025-07-14 NOTE — ED CDU PROVIDER DISPOSITION NOTE - CLINICAL COURSE
MARCIO Tan–patient placed in observation unit for chest pain.  EKG normal sinus troponin negative x 2 chest x-ray clear, CCTA CAD RADS 0 normal coronary arteries, patient informed of hepatic steatosis, recommended smoking cessation diet modification and exercise follow-up with PMD and cardiology as outpatient 1 to 2 weeks, strict return precaution provided, patient feels better is agreeable with plan

## 2025-07-14 NOTE — ED ADULT NURSE REASSESSMENT NOTE - NS ED NURSE REASSESS COMMENT FT1
Pt. received from previous RN. Pt. lying on stretcher, breathing with ease on RA. A&O x4. On CCM. 18g noted to the L AC; IV intact, no redness/swelling at site. Pt. OBS; awaiting testing.

## 2025-07-14 NOTE — ED CDU PROVIDER DISPOSITION NOTE - WR ORDER DATE AND TIME 1
[facility]  Cardiology  Progress Note    Patient Name: Lola Frazier  MRN: [id number]  Admission Date: 11/10/2017  Hospital Length of Stay: 2 days  Code Status: DNR   Attending Physician: Luiz Masters MD   Primary Care Physician: Yara Meléndez MD  Expected Discharge Date:   Principal Problem:Hypercalcemia    Subjective:     Hospital Course:   Pt remained stable overnight  Denied any complaints  No dizziness or nausea    Interval History: No overnight issues    Review of Systems   Constitution: Negative.   HENT: Negative.    Cardiovascular: Negative.    Respiratory: Negative.    Endocrine: Negative.    Skin: Negative.    Musculoskeletal: Negative.    Gastrointestinal: Negative.    Genitourinary: Negative.    Neurological: Negative.    Psychiatric/Behavioral: Negative.    Allergic/Immunologic: Negative.      Objective:     Vital Signs (Most Recent):  Temp: 98.1 °F (36.7 °C) (11/12/17 1957)  Pulse: 100 (11/12/17 1957)  Resp: 20 (11/12/17 1957)  BP: 119/60 (11/12/17 1957)  SpO2: 95 % (11/12/17 1957) Vital Signs (24h Range):  Temp:  [97 °F (36.1 °C)-98.3 °F (36.8 °C)] 98.1 °F (36.7 °C)  Pulse:  [] 100  Resp:  [16-20] 20  SpO2:  [94 %-98 %] 95 %  BP: ()/(50-61) 119/60     Weight: 38.1 kg (83 lb 15.9 oz)  Body mass index is 14.42 kg/m².     SpO2: 95 %  O2 Device (Oxygen Therapy): room air      Intake/Output Summary (Last 24 hours) at 11/12/17 2127  Last data filed at 11/12/17 1800   Gross per 24 hour   Intake             2660 ml   Output                0 ml   Net             2660 ml       Lines/Drains/Airways     Peripheral Intravenous Line                 Peripheral IV - Single Lumen 11/12/17 1445 Left Wrist less than 1 day                Physical Exam   Constitutional: She is oriented to person, place, and time. She appears well-developed and well-nourished.   HENT:   Head: Normocephalic and atraumatic.   Eyes: EOM are normal. Pupils are equal, round, and reactive to light. Right eye  exhibits no discharge. Left eye exhibits no discharge.   Neck: Normal range of motion. Neck supple. No JVD present. No tracheal deviation present. No thyromegaly present.   Cardiovascular: Normal rate, regular rhythm and intact distal pulses.    Pulmonary/Chest: Effort normal and breath sounds normal. No stridor.   Abdominal: Soft. Bowel sounds are normal. She exhibits no distension and no mass. There is no tenderness. There is no rebound and no guarding.   Musculoskeletal: Normal range of motion. She exhibits no edema, tenderness or deformity.   Lymphadenopathy:     She has no cervical adenopathy.   Neurological: She is alert and oriented to person, place, and time. She has normal reflexes. She displays normal reflexes. No cranial nerve deficit. She exhibits normal muscle tone. Coordination normal.   Skin: Skin is warm and dry. No rash noted. No erythema. No pallor.   Psychiatric: She has a normal mood and affect. Judgment and thought content normal.   Nursing note and vitals reviewed.      Significant Labs:   CMP   Recent Labs  Lab 11/11/17  0549 11/12/17  1145    140   K 3.8 2.9*    103   CO2 28 28   GLU 80 96   BUN 14 7*   CREATININE 0.7 0.6   CALCIUM 10.5 8.9   ANIONGAP 10 9   ESTGFRAFRICA >60 >60   EGFRNONAA >60 >60   , CBC   Recent Labs  Lab 11/12/17  1244   WBC 11.40   HGB 11.8*   HCT 35.8*       and INR No results for input(s): INR, PROTIME in the last 48 hours.    Significant Imaging: X-Ray: CXR: X-Ray Chest 1 View (CXR):   Results for orders placed or performed during the hospital encounter of 11/10/17   X-Ray Chest 1 View    Narrative    Comparison: 11/9/17 and prior    Technique: Single AP portable chest radiograph.    Findings:There has been a further interval increase in size of right midlung zone mass when compared to CT  radiograph dated 1/9/17, most likely representing a malignant mass such as bronchogenic carcinoma. This measures up to approximately 7 cm transverse. There  is pulmonary emphysema. Additional nodular opacities are identified in each upper and lower lung which most likely represent indolent infection given prior CT findings. No pneumothorax is seen. There is right hilar fullness which has increased compared to the 10/24/16 radiographs marked with an arrow.    No acute osseous abnormality is seen. There are healed rib fractures in the left upper posterior chest.    Impression    1.  Interval further increase in size of large right mid lung zone when compared to the prior studies, most likely representing a malignant mass such as a bronchogenic carcinoma. Probable right hilar lymphadenopathy.    2. Patchy linear and nodular opacities are also noted in each lung which are most compatible with indolent infection such as [place], as noted previously.    Findings discussed with Dr. Vivas at 12:35 hours on 11/10/17.        Electronically signed by: Noah Castillo MD  Date:     11/10/17  Time:    12:35      Assessment and Plan:     Brief HPI:     * Hypercalcemia    - In the setting of possible lung cancer - which is not tissue diagnosed yet as there is no desire to treat  - Improved with improved symptoms.   - Possible discharge tomorrow with follow up with her primary care physician as fmaily and pt herself do not want to pursue the lesion in her lung which is most probably lung cancer, causing hypercalcemia of malignancy         Hypotension    -Continued to have low blood pressure despite the fluid administtraiotn  - Check an ECHO   - HnH stable  - Leucycotsis may not necessarily be due to possible infection, but in the setting of on going tumor mass.   - However, follow the trend of the fever curve and wbc curve.         Mass of right lung    - Possible lung cancer, but pt does not want to pursue due to no intention to treat         Severe malnutrition    - In the setting of suspected cachexia related to possible lung cancer.   -Dietary encouragement and education.              VTE Risk Mitigation         Ordered     Medium Risk of VTE  Once      11/10/17 1448     Place RADHA hose  Until discontinued      11/10/17 1448     Place sequential compression device  Until discontinued      11/10/17 1448          Luiz Masters MD  Cardiology  [facility]   13-Jul-2025 14:58

## 2025-07-14 NOTE — ED CDU PROVIDER SUBSEQUENT DAY NOTE - PROGRESS NOTE DETAILS
Patient resting comfortably, offers no complaints. Awaiting CCTA. Patient given NTG sl x 2 at Ct. Awaiting results.

## 2025-07-14 NOTE — ED CDU PROVIDER SUBSEQUENT DAY NOTE - HISTORY
45 year old male, no past medical history, who presents with chest pain x2 days. +smoker. patient previously evaluated for chest pain, however, eloped from ed @ that time. no recent cardiac workup. patient placed in obs for ccta.

## 2025-07-14 NOTE — ED CDU PROVIDER DISPOSITION NOTE - PATIENT PORTAL LINK FT
You can access the FollowMyHealth Patient Portal offered by [facility] by registering at the following website: [email or web address]. By joining [facility]’s FollowMyHealth portal, you will also be able to view your health information using other applications (apps) compatible with our system.

## 2025-07-14 NOTE — ED CDU PROVIDER SUBSEQUENT DAY NOTE - NS ED MD PROGRESS NOTE ADD
Add Progress Note... No Residual Tumor Seen Histology Text: There were no malignant cells seen in the sections examined.

## 2025-07-14 NOTE — ED CDU PROVIDER SUBSEQUENT DAY NOTE - ATTENDING APP SHARED VISIT CONTRIBUTION OF CARE
45-year-old male no past medical history, active smoker presents with chest pain on Saturday while [place] [employer] associated with shortness of breath and lightheadedness.  States he has had intermittent pain for the last few weeks/months.  No cardiologist.  Positive family history.  No prior cardiac workup.  Patient seen in ED and had EKG which was normal sinus, T wave inversions inferiorly.  Troponin 12 and 10, chest x-ray clear, placed in observation unit for CCTA.  No overnight events.  Has no complaints at this time.  No active chest pain in ED.    On exam, AFVSS, Well appearing, No acute distress, NCAT, EOMI, PERRLA, MMM, Neck supple, LCTAB, RRR nl s1s2 No mrg, Abdomen Soft NTND, AAOx3, No Focal Deficits, No LE edema or calf TTP,    A/P; observation unit pending CCTA

## 2025-07-15 LAB
CULTURE RESULTS: SIGNIFICANT CHANGE UP
SPECIMEN SOURCE: SIGNIFICANT CHANGE UP